# Patient Record
Sex: FEMALE | Race: WHITE | NOT HISPANIC OR LATINO
[De-identification: names, ages, dates, MRNs, and addresses within clinical notes are randomized per-mention and may not be internally consistent; named-entity substitution may affect disease eponyms.]

---

## 2017-04-06 ENCOUNTER — APPOINTMENT (OUTPATIENT)
Dept: CARDIOLOGY | Facility: CLINIC | Age: 82
End: 2017-04-06

## 2017-04-10 ENCOUNTER — NON-APPOINTMENT (OUTPATIENT)
Age: 82
End: 2017-04-10

## 2017-04-10 ENCOUNTER — APPOINTMENT (OUTPATIENT)
Dept: CARDIOLOGY | Facility: CLINIC | Age: 82
End: 2017-04-10

## 2017-04-10 ENCOUNTER — INPATIENT (INPATIENT)
Facility: HOSPITAL | Age: 82
LOS: 3 days | Discharge: NURSING, REHAB OR LONG-TERM CARE | End: 2017-04-14
Attending: INTERNAL MEDICINE | Admitting: INTERNAL MEDICINE
Payer: MEDICARE

## 2017-04-10 VITALS
OXYGEN SATURATION: 95 % | HEART RATE: 92 BPM | DIASTOLIC BLOOD PRESSURE: 76 MMHG | WEIGHT: 170 LBS | HEIGHT: 66 IN | BODY MASS INDEX: 27.32 KG/M2 | RESPIRATION RATE: 16 BRPM | SYSTOLIC BLOOD PRESSURE: 126 MMHG

## 2017-04-10 VITALS
SYSTOLIC BLOOD PRESSURE: 124 MMHG | OXYGEN SATURATION: 94 % | TEMPERATURE: 98 F | RESPIRATION RATE: 29 BRPM | HEART RATE: 85 BPM | DIASTOLIC BLOOD PRESSURE: 60 MMHG

## 2017-04-10 DIAGNOSIS — Z98.890 OTHER SPECIFIED POSTPROCEDURAL STATES: Chronic | ICD-10-CM

## 2017-04-10 DIAGNOSIS — I50.9 HEART FAILURE, UNSPECIFIED: ICD-10-CM

## 2017-04-10 DIAGNOSIS — I48.91 UNSPECIFIED ATRIAL FIBRILLATION: ICD-10-CM

## 2017-04-10 DIAGNOSIS — J44.9 CHRONIC OBSTRUCTIVE PULMONARY DISEASE, UNSPECIFIED: ICD-10-CM

## 2017-04-10 DIAGNOSIS — K21.9 GASTRO-ESOPHAGEAL REFLUX DISEASE WITHOUT ESOPHAGITIS: ICD-10-CM

## 2017-04-10 DIAGNOSIS — Z90.710 ACQUIRED ABSENCE OF BOTH CERVIX AND UTERUS: Chronic | ICD-10-CM

## 2017-04-10 DIAGNOSIS — N17.9 ACUTE KIDNEY FAILURE, UNSPECIFIED: ICD-10-CM

## 2017-04-10 DIAGNOSIS — I50.33 ACUTE ON CHRONIC DIASTOLIC (CONGESTIVE) HEART FAILURE: ICD-10-CM

## 2017-04-10 DIAGNOSIS — Z41.8 ENCOUNTER FOR OTHER PROCEDURES FOR PURPOSES OTHER THAN REMEDYING HEALTH STATE: ICD-10-CM

## 2017-04-10 LAB
ALBUMIN SERPL ELPH-MCNC: 3.7 G/DL — SIGNIFICANT CHANGE UP (ref 3.3–5)
ALP SERPL-CCNC: 47 U/L — SIGNIFICANT CHANGE UP (ref 40–120)
ALT FLD-CCNC: 22 U/L — SIGNIFICANT CHANGE UP (ref 4–33)
APTT BLD: 34.2 SEC — SIGNIFICANT CHANGE UP (ref 27.5–37.4)
AST SERPL-CCNC: 18 U/L — SIGNIFICANT CHANGE UP (ref 4–32)
BASOPHILS # BLD AUTO: 0.01 K/UL — SIGNIFICANT CHANGE UP (ref 0–0.2)
BASOPHILS NFR BLD AUTO: 0.1 % — SIGNIFICANT CHANGE UP (ref 0–2)
BILIRUB SERPL-MCNC: 0.7 MG/DL — SIGNIFICANT CHANGE UP (ref 0.2–1.2)
BUN SERPL-MCNC: 60 MG/DL — HIGH (ref 7–23)
CALCIUM SERPL-MCNC: 8.6 MG/DL — SIGNIFICANT CHANGE UP (ref 8.4–10.5)
CHLORIDE SERPL-SCNC: 100 MMOL/L — SIGNIFICANT CHANGE UP (ref 98–107)
CO2 SERPL-SCNC: 27 MMOL/L — SIGNIFICANT CHANGE UP (ref 22–31)
CREAT SERPL-MCNC: 2.15 MG/DL — HIGH (ref 0.5–1.3)
EOSINOPHIL # BLD AUTO: 0.01 K/UL — SIGNIFICANT CHANGE UP (ref 0–0.5)
EOSINOPHIL NFR BLD AUTO: 0.1 % — SIGNIFICANT CHANGE UP (ref 0–6)
GLUCOSE SERPL-MCNC: 167 MG/DL — HIGH (ref 70–99)
HCT VFR BLD CALC: 30.9 % — LOW (ref 34.5–45)
HGB BLD-MCNC: 9.2 G/DL — LOW (ref 11.5–15.5)
IMM GRANULOCYTES NFR BLD AUTO: 0.7 % — SIGNIFICANT CHANGE UP (ref 0–1.5)
INR BLD: 1.89 — HIGH (ref 0.88–1.17)
LYMPHOCYTES # BLD AUTO: 0.6 K/UL — LOW (ref 1–3.3)
LYMPHOCYTES # BLD AUTO: 7.4 % — LOW (ref 13–44)
MAGNESIUM SERPL-MCNC: 1.2 MG/DL — LOW (ref 1.6–2.6)
MCHC RBC-ENTMCNC: 26.6 PG — LOW (ref 27–34)
MCHC RBC-ENTMCNC: 29.8 % — LOW (ref 32–36)
MCV RBC AUTO: 89.3 FL — SIGNIFICANT CHANGE UP (ref 80–100)
MONOCYTES # BLD AUTO: 0.12 K/UL — SIGNIFICANT CHANGE UP (ref 0–0.9)
MONOCYTES NFR BLD AUTO: 1.5 % — LOW (ref 2–14)
NEUTROPHILS # BLD AUTO: 7.35 K/UL — SIGNIFICANT CHANGE UP (ref 1.8–7.4)
NEUTROPHILS NFR BLD AUTO: 90.2 % — HIGH (ref 43–77)
NT-PROBNP SERPL-SCNC: SIGNIFICANT CHANGE UP PG/ML
PHOSPHATE SERPL-MCNC: 3.6 MG/DL — SIGNIFICANT CHANGE UP (ref 2.5–4.5)
PLATELET # BLD AUTO: 222 K/UL — SIGNIFICANT CHANGE UP (ref 150–400)
PMV BLD: 9.8 FL — SIGNIFICANT CHANGE UP (ref 7–13)
POTASSIUM SERPL-MCNC: 4.6 MMOL/L — SIGNIFICANT CHANGE UP (ref 3.5–5.3)
POTASSIUM SERPL-SCNC: 4.6 MMOL/L — SIGNIFICANT CHANGE UP (ref 3.5–5.3)
PROT SERPL-MCNC: 6.4 G/DL — SIGNIFICANT CHANGE UP (ref 6–8.3)
PROTHROM AB SERPL-ACNC: 21.4 SEC — HIGH (ref 9.8–13.1)
RBC # BLD: 3.46 M/UL — LOW (ref 3.8–5.2)
RBC # FLD: 16.2 % — HIGH (ref 10.3–14.5)
SODIUM SERPL-SCNC: 142 MMOL/L — SIGNIFICANT CHANGE UP (ref 135–145)
WBC # BLD: 8.15 K/UL — SIGNIFICANT CHANGE UP (ref 3.8–10.5)
WBC # FLD AUTO: 8.15 K/UL — SIGNIFICANT CHANGE UP (ref 3.8–10.5)

## 2017-04-10 PROCEDURE — 71010: CPT | Mod: 26

## 2017-04-10 RX ORDER — IPRATROPIUM/ALBUTEROL SULFATE 18-103MCG
3 AEROSOL WITH ADAPTER (GRAM) INHALATION EVERY 6 HOURS
Qty: 0 | Refills: 0 | Status: DISCONTINUED | OUTPATIENT
Start: 2017-04-10 | End: 2017-04-11

## 2017-04-10 RX ORDER — BUDESONIDE AND FORMOTEROL FUMARATE DIHYDRATE 160; 4.5 UG/1; UG/1
2 AEROSOL RESPIRATORY (INHALATION)
Qty: 0 | Refills: 0 | Status: DISCONTINUED | OUTPATIENT
Start: 2017-04-10 | End: 2017-04-11

## 2017-04-10 RX ORDER — SIMVASTATIN 20 MG/1
40 TABLET, FILM COATED ORAL AT BEDTIME
Qty: 0 | Refills: 0 | Status: DISCONTINUED | OUTPATIENT
Start: 2017-04-10 | End: 2017-04-14

## 2017-04-10 RX ORDER — PANTOPRAZOLE SODIUM 20 MG/1
40 TABLET, DELAYED RELEASE ORAL
Qty: 0 | Refills: 0 | Status: DISCONTINUED | OUTPATIENT
Start: 2017-04-10 | End: 2017-04-14

## 2017-04-10 RX ORDER — BUMETANIDE 0.25 MG/ML
0.5 INJECTION INTRAMUSCULAR; INTRAVENOUS
Qty: 10 | Refills: 0 | Status: DISCONTINUED | OUTPATIENT
Start: 2017-04-10 | End: 2017-04-13

## 2017-04-10 RX ORDER — IPRATROPIUM BROMIDE 0.2 MG/ML
1 SOLUTION, NON-ORAL INHALATION THREE TIMES A DAY
Qty: 0 | Refills: 0 | Status: DISCONTINUED | OUTPATIENT
Start: 2017-04-10 | End: 2017-04-11

## 2017-04-10 RX ORDER — MAGNESIUM SULFATE 500 MG/ML
2 VIAL (ML) INJECTION ONCE
Qty: 0 | Refills: 0 | Status: COMPLETED | OUTPATIENT
Start: 2017-04-10 | End: 2017-04-10

## 2017-04-10 RX ADMIN — BUMETANIDE 5 MG/HR: 0.25 INJECTION INTRAMUSCULAR; INTRAVENOUS at 19:19

## 2017-04-10 RX ADMIN — Medication 50 GRAM(S): at 21:12

## 2017-04-10 RX ADMIN — Medication 3 MILLILITER(S): at 21:50

## 2017-04-10 RX ADMIN — SIMVASTATIN 40 MILLIGRAM(S): 20 TABLET, FILM COATED ORAL at 21:12

## 2017-04-10 NOTE — H&P ADULT. - PROBLEM SELECTOR PROBLEM 4
Gastroesophageal reflux disease, esophagitis presence not specified Chronic obstructive pulmonary disease, unspecified COPD type

## 2017-04-10 NOTE — H&P ADULT. - PMH
AAA (Abdominal Aortic Aneurysm)    AAA (abdominal aortic aneurysm)  incisional hernia  Anxiety    Arthritis of Knee    Atrial Fibrillation    Cancer  bladder  COPD, moderate  uses o2 at home  Emphysema    GERD (Gastroesophageal Reflux Disease)    HTN - Hypertension    Hx of Bladder Cancer  Late 1990's  Hyperlipidemia    Hyperlipidemia    Hypertension    Otosclerosis, left  [place]- left ear

## 2017-04-10 NOTE — H&P ADULT. - PROBLEM SELECTOR PLAN 2
- likely 2/2 cardiorenal, will diurese aggressively and monitor  - renally dose meds  - hold entresto and xarelto

## 2017-04-10 NOTE — H&P ADULT. - PROBLEM SELECTOR PLAN 6
- DVT ppx - took AM dose Xarelto already, will follow up tomorrow, may need HSQ  - Diet - low Na, no conc phos  - Dispo - pending

## 2017-04-10 NOTE — H&P ADULT. - PSH
History of Dilatation and Curettage x3  38 years ago  History of Tonsillectomy  60 years ago  History of Total Hysterectomy with Removal of Both Tubes and Ovaries  40 years ago  S/P AAA repair  11/2011  S/P breast lumpectomy    S/P cataract surgery  2012 - bilateral eyes  S/P hysterectomy    Transurethral resection of bladder tumor x2  Late 1990's - x 2 TURBT

## 2017-04-10 NOTE — H&P ADULT. - ASSESSMENT
87yoF PMH HFpEF (55%), COPD on home O2 admitted from HF clinic for acute on chronic HF, planned for aggressive diuresis

## 2017-04-10 NOTE — H&P ADULT. - HISTORY OF PRESENT ILLNESS
87yoF PMH HFpEF (55%), COPD on home O2 (2-3L), mod MR/TR/AI, A fib on Xarelto, admitted from HF clinic due to fluid overload and concern for acute of chronic HF.  There was concern for worsening renal function and uptrending potassium as an outpatient and diuretics were slowed down.  Pt denies CP but is very SOB and shortness of breathe has been worsening over the last month, she cannot climb steps, ambulate with cane only a few steps, sleeps on 2 pillows.  Pt has also noted significant worsening in her lower extremity edema.  Denies any n/v/d, change in bowel habits, notes that she has been urinating less.

## 2017-04-10 NOTE — H&P ADULT. - PROBLEM SELECTOR PLAN 1
- plan for aggressive diuresis with bumex gtt at 0.5mg/hr  - strict i/o, solano if needed  - c/w home metolazone 2.5mg qD  - f/u BNP  - holding home entresto in the setting of FITZ  - holding home metoprolol in the setting of acute HF exacerbation

## 2017-04-10 NOTE — H&P ADULT. - PROBLEM SELECTOR PLAN 3
- rate controlled at this time, may go up since holding metoprolol, holding xarelto 2/2 to FITZ, took dose this AM, will follow up tomorrow for further anticoagulation needs

## 2017-04-11 ENCOUNTER — TRANSCRIPTION ENCOUNTER (OUTPATIENT)
Age: 82
End: 2017-04-11

## 2017-04-11 LAB
ANISOCYTOSIS BLD QL: SLIGHT — SIGNIFICANT CHANGE UP
APPEARANCE UR: CLEAR — SIGNIFICANT CHANGE UP
APTT BLD: 51.7 SEC — HIGH (ref 27.5–37.4)
APTT BLD: 89.3 SEC — HIGH (ref 27.5–37.4)
BACTERIA # UR AUTO: SIGNIFICANT CHANGE UP
BASOPHILS # BLD AUTO: 0.01 K/UL — SIGNIFICANT CHANGE UP (ref 0–0.2)
BASOPHILS NFR BLD AUTO: 0.1 % — SIGNIFICANT CHANGE UP (ref 0–2)
BASOPHILS NFR SPEC: 0 % — SIGNIFICANT CHANGE UP (ref 0–2)
BILIRUB UR-MCNC: NEGATIVE — SIGNIFICANT CHANGE UP
BLOOD UR QL VISUAL: NEGATIVE — SIGNIFICANT CHANGE UP
BUN SERPL-MCNC: 65 MG/DL — HIGH (ref 7–23)
BUN SERPL-MCNC: 66 MG/DL — HIGH (ref 7–23)
CALCIUM SERPL-MCNC: 8.3 MG/DL — LOW (ref 8.4–10.5)
CALCIUM SERPL-MCNC: 8.7 MG/DL — SIGNIFICANT CHANGE UP (ref 8.4–10.5)
CHLORIDE SERPL-SCNC: 100 MMOL/L — SIGNIFICANT CHANGE UP (ref 98–107)
CHLORIDE SERPL-SCNC: 103 MMOL/L — SIGNIFICANT CHANGE UP (ref 98–107)
CO2 SERPL-SCNC: 28 MMOL/L — SIGNIFICANT CHANGE UP (ref 22–31)
CO2 SERPL-SCNC: 30 MMOL/L — SIGNIFICANT CHANGE UP (ref 22–31)
COLOR SPEC: COLORLESS — SIGNIFICANT CHANGE UP
CREAT SERPL-MCNC: 2.33 MG/DL — HIGH (ref 0.5–1.3)
CREAT SERPL-MCNC: 2.46 MG/DL — HIGH (ref 0.5–1.3)
EOSINOPHIL # BLD AUTO: 0.02 K/UL — SIGNIFICANT CHANGE UP (ref 0–0.5)
EOSINOPHIL NFR BLD AUTO: 0.2 % — SIGNIFICANT CHANGE UP (ref 0–6)
EOSINOPHIL NFR FLD: 0 % — SIGNIFICANT CHANGE UP (ref 0–6)
GIANT PLATELETS BLD QL SMEAR: PRESENT — SIGNIFICANT CHANGE UP
GLUCOSE SERPL-MCNC: 135 MG/DL — HIGH (ref 70–99)
GLUCOSE SERPL-MCNC: 170 MG/DL — HIGH (ref 70–99)
GLUCOSE UR-MCNC: NEGATIVE — SIGNIFICANT CHANGE UP
HCT VFR BLD CALC: 28.7 % — LOW (ref 34.5–45)
HGB BLD-MCNC: 8.3 G/DL — LOW (ref 11.5–15.5)
HYALINE CASTS # UR AUTO: SIGNIFICANT CHANGE UP (ref 0–?)
IMM GRANULOCYTES NFR BLD AUTO: 0.4 % — SIGNIFICANT CHANGE UP (ref 0–1.5)
KETONES UR-MCNC: NEGATIVE — SIGNIFICANT CHANGE UP
LEUKOCYTE ESTERASE UR-ACNC: HIGH
LYMPHOCYTES # BLD AUTO: 1.17 K/UL — SIGNIFICANT CHANGE UP (ref 1–3.3)
LYMPHOCYTES # BLD AUTO: 12.6 % — LOW (ref 13–44)
LYMPHOCYTES NFR SPEC AUTO: 5 % — LOW (ref 13–44)
MACROCYTES BLD QL: SLIGHT — SIGNIFICANT CHANGE UP
MAGNESIUM SERPL-MCNC: 1.7 MG/DL — SIGNIFICANT CHANGE UP (ref 1.6–2.6)
MAGNESIUM SERPL-MCNC: 1.7 MG/DL — SIGNIFICANT CHANGE UP (ref 1.6–2.6)
MANUAL SMEAR VERIFICATION: SIGNIFICANT CHANGE UP
MCHC RBC-ENTMCNC: 25.9 PG — LOW (ref 27–34)
MCHC RBC-ENTMCNC: 28.9 % — LOW (ref 32–36)
MCV RBC AUTO: 89.7 FL — SIGNIFICANT CHANGE UP (ref 80–100)
MONOCYTES # BLD AUTO: 0.44 K/UL — SIGNIFICANT CHANGE UP (ref 0–0.9)
MONOCYTES NFR BLD AUTO: 4.7 % — SIGNIFICANT CHANGE UP (ref 2–14)
MONOCYTES NFR BLD: 5 % — SIGNIFICANT CHANGE UP (ref 2–9)
MUCOUS THREADS # UR AUTO: SIGNIFICANT CHANGE UP
MYELOCYTES NFR BLD: 1 % — HIGH (ref 0–0)
NEUTROPHIL AB SER-ACNC: 86 % — HIGH (ref 43–77)
NEUTROPHILS # BLD AUTO: 7.62 K/UL — HIGH (ref 1.8–7.4)
NEUTROPHILS NFR BLD AUTO: 82 % — HIGH (ref 43–77)
NITRITE UR-MCNC: POSITIVE — HIGH
NON-SQ EPI CELLS # UR AUTO: <1 — SIGNIFICANT CHANGE UP
PH UR: 6 — SIGNIFICANT CHANGE UP (ref 4.6–8)
PHOSPHATE SERPL-MCNC: 5.9 MG/DL — HIGH (ref 2.5–4.5)
PLATELET # BLD AUTO: 234 K/UL — SIGNIFICANT CHANGE UP (ref 150–400)
PMV BLD: 10.1 FL — SIGNIFICANT CHANGE UP (ref 7–13)
POIKILOCYTOSIS BLD QL AUTO: SLIGHT — SIGNIFICANT CHANGE UP
POLYCHROMASIA BLD QL SMEAR: SLIGHT — SIGNIFICANT CHANGE UP
POTASSIUM SERPL-MCNC: 4.3 MMOL/L — SIGNIFICANT CHANGE UP (ref 3.5–5.3)
POTASSIUM SERPL-MCNC: 4.8 MMOL/L — SIGNIFICANT CHANGE UP (ref 3.5–5.3)
POTASSIUM SERPL-SCNC: 4.3 MMOL/L — SIGNIFICANT CHANGE UP (ref 3.5–5.3)
POTASSIUM SERPL-SCNC: 4.8 MMOL/L — SIGNIFICANT CHANGE UP (ref 3.5–5.3)
PROT UR-MCNC: NEGATIVE — SIGNIFICANT CHANGE UP
RBC # BLD: 3.2 M/UL — LOW (ref 3.8–5.2)
RBC # FLD: 16.2 % — HIGH (ref 10.3–14.5)
RBC CASTS # UR COMP ASSIST: SIGNIFICANT CHANGE UP (ref 0–?)
SODIUM SERPL-SCNC: 146 MMOL/L — HIGH (ref 135–145)
SODIUM SERPL-SCNC: 147 MMOL/L — HIGH (ref 135–145)
SP GR SPEC: 1.01 — SIGNIFICANT CHANGE UP (ref 1–1.03)
SQUAMOUS # UR AUTO: SIGNIFICANT CHANGE UP
UROBILINOGEN FLD QL: NORMAL E.U. — SIGNIFICANT CHANGE UP (ref 0.1–0.2)
VARIANT LYMPHS # BLD: 3 % — SIGNIFICANT CHANGE UP
WBC # BLD: 9.3 K/UL — SIGNIFICANT CHANGE UP (ref 3.8–10.5)
WBC # FLD AUTO: 9.3 K/UL — SIGNIFICANT CHANGE UP (ref 3.8–10.5)
WBC UR QL: HIGH (ref 0–?)

## 2017-04-11 PROCEDURE — 93306 TTE W/DOPPLER COMPLETE: CPT | Mod: 26

## 2017-04-11 PROCEDURE — 93010 ELECTROCARDIOGRAM REPORT: CPT

## 2017-04-11 PROCEDURE — 99233 SBSQ HOSP IP/OBS HIGH 50: CPT

## 2017-04-11 RX ORDER — HEPARIN SODIUM 5000 [USP'U]/ML
1200 INJECTION INTRAVENOUS; SUBCUTANEOUS
Qty: 25000 | Refills: 0 | Status: DISCONTINUED | OUTPATIENT
Start: 2017-04-11 | End: 2017-04-13

## 2017-04-11 RX ORDER — HEPARIN SODIUM 5000 [USP'U]/ML
1000 INJECTION INTRAVENOUS; SUBCUTANEOUS
Qty: 25000 | Refills: 0 | Status: DISCONTINUED | OUTPATIENT
Start: 2017-04-11 | End: 2017-04-11

## 2017-04-11 RX ORDER — IPRATROPIUM BROMIDE 0.2 MG/ML
2 SOLUTION, NON-ORAL INHALATION THREE TIMES A DAY
Qty: 0 | Refills: 0 | Status: DISCONTINUED | OUTPATIENT
Start: 2017-04-11 | End: 2017-04-14

## 2017-04-11 RX ORDER — MAGNESIUM SULFATE 500 MG/ML
2 VIAL (ML) INJECTION ONCE
Qty: 0 | Refills: 0 | Status: COMPLETED | OUTPATIENT
Start: 2017-04-11 | End: 2017-04-11

## 2017-04-11 RX ORDER — CEFTRIAXONE 500 MG/1
1 INJECTION, POWDER, FOR SOLUTION INTRAMUSCULAR; INTRAVENOUS ONCE
Qty: 0 | Refills: 0 | Status: COMPLETED | OUTPATIENT
Start: 2017-04-11 | End: 2017-04-11

## 2017-04-11 RX ORDER — BUDESONIDE, MICRONIZED 100 %
0.5 POWDER (GRAM) MISCELLANEOUS
Qty: 0 | Refills: 0 | Status: DISCONTINUED | OUTPATIENT
Start: 2017-04-11 | End: 2017-04-14

## 2017-04-11 RX ORDER — CEFTRIAXONE 500 MG/1
1 INJECTION, POWDER, FOR SOLUTION INTRAMUSCULAR; INTRAVENOUS EVERY 24 HOURS
Qty: 0 | Refills: 0 | Status: DISCONTINUED | OUTPATIENT
Start: 2017-04-12 | End: 2017-04-12

## 2017-04-11 RX ORDER — LEVALBUTEROL 1.25 MG/.5ML
2 SOLUTION, CONCENTRATE RESPIRATORY (INHALATION) EVERY 4 HOURS
Qty: 0 | Refills: 0 | Status: DISCONTINUED | OUTPATIENT
Start: 2017-04-11 | End: 2017-04-14

## 2017-04-11 RX ORDER — MAGNESIUM SULFATE 500 MG/ML
1 VIAL (ML) INJECTION ONCE
Qty: 0 | Refills: 0 | Status: COMPLETED | OUTPATIENT
Start: 2017-04-11 | End: 2017-04-11

## 2017-04-11 RX ORDER — WARFARIN SODIUM 2.5 MG/1
5 TABLET ORAL ONCE
Qty: 0 | Refills: 0 | Status: COMPLETED | OUTPATIENT
Start: 2017-04-11 | End: 2017-04-11

## 2017-04-11 RX ORDER — CEFTRIAXONE 500 MG/1
INJECTION, POWDER, FOR SOLUTION INTRAMUSCULAR; INTRAVENOUS
Qty: 0 | Refills: 0 | Status: DISCONTINUED | OUTPATIENT
Start: 2017-04-11 | End: 2017-04-12

## 2017-04-11 RX ADMIN — SIMVASTATIN 40 MILLIGRAM(S): 20 TABLET, FILM COATED ORAL at 22:20

## 2017-04-11 RX ADMIN — Medication 2 PUFF(S): at 21:24

## 2017-04-11 RX ADMIN — CEFTRIAXONE 100 GRAM(S): 500 INJECTION, POWDER, FOR SOLUTION INTRAMUSCULAR; INTRAVENOUS at 04:35

## 2017-04-11 RX ADMIN — Medication 0.5 MILLIGRAM(S): at 21:24

## 2017-04-11 RX ADMIN — Medication 10 MILLIGRAM(S): at 06:36

## 2017-04-11 RX ADMIN — Medication 100 GRAM(S): at 08:45

## 2017-04-11 RX ADMIN — Medication 2 PUFF(S): at 16:04

## 2017-04-11 RX ADMIN — WARFARIN SODIUM 5 MILLIGRAM(S): 2.5 TABLET ORAL at 17:47

## 2017-04-11 RX ADMIN — HEPARIN SODIUM 11 UNIT(S)/HR: 5000 INJECTION INTRAVENOUS; SUBCUTANEOUS at 23:29

## 2017-04-11 RX ADMIN — Medication 3 MILLILITER(S): at 03:33

## 2017-04-11 RX ADMIN — Medication 50 GRAM(S): at 17:47

## 2017-04-11 RX ADMIN — BUMETANIDE 5 MG/HR: 0.25 INJECTION INTRAMUSCULAR; INTRAVENOUS at 23:29

## 2017-04-11 RX ADMIN — PANTOPRAZOLE SODIUM 40 MILLIGRAM(S): 20 TABLET, DELAYED RELEASE ORAL at 06:36

## 2017-04-11 RX ADMIN — HEPARIN SODIUM 10 UNIT(S)/HR: 5000 INJECTION INTRAVENOUS; SUBCUTANEOUS at 09:44

## 2017-04-11 RX ADMIN — BUMETANIDE 5 MG/HR: 0.25 INJECTION INTRAMUSCULAR; INTRAVENOUS at 08:44

## 2017-04-11 NOTE — PHYSICAL THERAPY INITIAL EVALUATION ADULT - PERTINENT HX OF CURRENT PROBLEM, REHAB EVAL
87yoF PMH HFpEF (55%), COPD on home O2 (2-3L), mod MR/TR/AI, A fib on Xarelto, admitted from HF clinic due to fluid overload and concern for acute of chronic HF.

## 2017-04-11 NOTE — DISCHARGE NOTE ADULT - CONDITIONS AT DISCHARGE
Pt sent to rehab in no apparent distress. VSS Pt denied chest pain.  Pt had constant HERRING. and experience SOB that required O2 2LNC at all times.  RR 20-28.  BS diminished t/o. No productive cough noted. Neuro: Pt alert and oriented but at times was pleasantly confused.  She made needs known. Pt had poor ability to stand and help herself out of chair.  She stated that her "balance was poor and couldn't keep herself from sitting back down"  Pt had +BS and had a small BM this am.  She voided frequently clear yellow urine.

## 2017-04-11 NOTE — PHYSICAL THERAPY INITIAL EVALUATION ADULT - ADDITIONAL COMMENTS
Pt lives in private house with 5 steps to enter and chair lift on full flight to bedroom. Pt lives with daughter and ambulates in home with RW.

## 2017-04-11 NOTE — DISCHARGE NOTE ADULT - HOSPITAL COURSE
87yoF PMH COPD on home O2, HFpEF (55%), mod MR/TR/AI, Afib on Xarelto admitted from HF clinic (Dr. Bangura) directly to CCU for aggressive diuresis.  Was started on bumex gtt 0.5mg/hr and metolazone 2.5 mg qD.  Xarleto was initially held for worsened Cr, was started on hep gtt and transitioned to coumadin. 87yoF PMH COPD on home O2, HFpEF (55%), mod MR/TR/AI, Afib on Xarelto admitted from HF clinic (Dr. Bangura) directly to CCU for aggressive diuresis.  Was started on bumex gtt 0.5mg/hr and metolazone 2.5 mg qD.  Xarleto and Entresto was held for worsened Cr, was started on hep gtt and transitioned to coumadin.  Pt diuresed well was transitioned to PO Bumex and started on spironolactone.  Per HF attending Dr. Bangura, pt was recommended for Eliquis and started on Eliquis.  Pt was recommended for SAH by PT and was sent to .... 87yoF PMH COPD on home O2, HFpEF (55%), mod MR/TR/AI, Afib on Xarelto admitted from HF clinic (Dr. Bangura) directly to CCU for aggressive diuresis.  Was started on bumex gtt 0.5mg/hr and metolazone 2.5 mg qD.  Xarleto and Entresto was held for worsened Cr, was started on hep gtt and transitioned to coumadin.  Pt diuresed well was transitioned to PO Bumex and started on spironolactone.  Per HF attending Dr. Bangura, pt was recommended for Eliquis and started on Eliquis.  Pt was recommended for SAH by PT and was sent to rehab 4/14.  Pt was planned to be maintained on PO Bumex 2mg qD.

## 2017-04-11 NOTE — DISCHARGE NOTE ADULT - MEDICATION SUMMARY - MEDICATIONS TO TAKE
I will START or STAY ON the medications listed below when I get home from the hospital:    budesonide 0.5 mg/2 mL inhalation suspension  -- 2 milliliter(s) inhaled 2 times a day  -- Indication: For COPD    predniSONE 20 mg oral tablet  -- 1 tab(s) by mouth once a day  -- Indication: For COPD    spironolactone 25 mg oral tablet  -- 0.5 tab(s) by mouth once a day  -- Indication: For Acute heart failure with normal ejection fraction    simvastatin 40 mg oral tablet  -- 1 tab(s) by mouth once a day (at bedtime)  -- Indication: For CAD    Xopenex HFA 45 mcg/inh inhalation aerosol  -- 2 puff(s) inhaled every 4 hours, As Needed  -- Indication: For COPD    ipratropium 18 mcg/inh inhalation aerosol  -- 1 puff(s) inhaled 3 to 4 times a day  -- Indication: For COPD    nystatin 100,000 units/g topical powder  -- 1 application on skin every 8 hours, As needed, antifungal  -- Indication: For Need for prophylactic measure    bumetanide 2 mg oral tablet  -- 1 tab(s) by mouth once a day  -- Indication: For Acute heart failure with normal ejection fraction    pantoprazole 40 mg oral delayed release tablet  -- 1 tab(s) by mouth once a day  -- Indication: For GERD

## 2017-04-11 NOTE — DISCHARGE NOTE ADULT - CARE PLAN
Principal Discharge DX:	Heart failure with preserved ejection fraction  Goal:	Follow up with Dr. Bangura in clinic  Instructions for follow-up, activity and diet:	Please take your prescribed heart failure medications and see Dr. Bangura in clinic.  Secondary Diagnosis:	Chronic obstructive pulmonary disease, unspecified COPD type  Goal:	Follow up with Dr. Patel in clinic  Instructions for follow-up, activity and diet:	Please take your prescribed COPD medications and see Dr. Patel in clinic. Principal Discharge DX:	Heart failure with preserved ejection fraction  Goal:	Follow up with Dr. Bangura in clinic  Instructions for follow-up, activity and diet:	Please take your prescribed heart failure medications and see Dr. Bangura in clinic.  Call [phone] to make an appointment within the next two weeks.  Also, please make sure that your INR is checked at rehab and make sure that it is trending to normal, your coumadin was stopped and you were recommended to start on Eliquis 2.5mg BID.  Start Eliquis when your INR is less than 2.0.  Secondary Diagnosis:	Chronic obstructive pulmonary disease, unspecified COPD type  Goal:	Follow up with Dr. Patel in clinic  Instructions for follow-up, activity and diet:	Please take your prescribed COPD medications and see Dr. Patel in clinic. [phone]. Principal Discharge DX:	Heart failure with preserved ejection fraction  Goal:	Follow up with Dr. Bangura in clinic  Instructions for follow-up, activity and diet:	Please take your prescribed heart failure medications and see Dr. Bangura in clinic.  Call [phone] to make an appointment within the next two weeks.  Also, please make sure that your INR is checked at rehab and make sure that it is trending to normal, your coumadin was stopped and you were recommended to start on Eliquis 2.5mg BID.  Start Eliquis when your INR is less than 2.0.  Secondary Diagnosis:	Chronic obstructive pulmonary disease, unspecified COPD type  Goal:	Follow up with Dr. Patel in clinic  Instructions for follow-up, activity and diet:	Please take your prescribed COPD medications and see Dr. Patel in clinic. [phone]. Principal Discharge DX:	Heart failure with preserved ejection fraction  Goal:	Follow up with Dr. Bangura in clinic  Instructions for follow-up, activity and diet:	Please take your prescribed heart failure medications and see Dr. Bangura in clinic.  Call [phone] to make an appointment within the next two weeks.  Also, please make sure that your INR is checked at rehab and make sure that it is trending to normal, your coumadin was stopped and you were recommended to start on Eliquis 2.5mg BID.  Start Eliquis when your INR is less than 2.0.  Secondary Diagnosis:	Chronic obstructive pulmonary disease, unspecified COPD type  Goal:	Follow up with Dr. Patel in clinic  Instructions for follow-up, activity and diet:	Please take your prescribed COPD medications and see Dr. Patel in clinic. [phone].

## 2017-04-11 NOTE — DISCHARGE NOTE ADULT - CARE PROVIDER_API CALL
Sameer Bangura), Adv Heart Fail [facility] Card[facility]  [address] [place] [address]  Phone: [phone]  Fax: [phone]    Shi Patel (DO), Critical Care Medicine; Internal Medicine; Pulmonary Disease  [address]  [place] [address]  Phone: [phone]  Fax: [phone]

## 2017-04-11 NOTE — DISCHARGE NOTE ADULT - PLAN OF CARE
Follow up with Dr. Bangura in clinic Follow up with Dr. Patel in clinic Please take your prescribed heart failure medications and see Dr. Bangura in clinic. Please take your prescribed COPD medications and see Dr. Patel in clinic. Please take your prescribed heart failure medications and see Dr. Bangura in clinic.  Call [phone] to make an appointment within the next two weeks.  Also, please make sure that your INR is checked at rehab and make sure that it is trending to normal, your coumadin was stopped and you were recommended to start on Eliquis 2.5mg BID.  Start Eliquis when your INR is less than 2.0. Please take your prescribed COPD medications and see Dr. Patel in clinic. [phone].

## 2017-04-11 NOTE — DISCHARGE NOTE ADULT - CARE PROVIDERS DIRECT ADDRESSES
[email or web address]

## 2017-04-11 NOTE — DISCHARGE NOTE ADULT - PATIENT PORTAL LINK FT
“You can access the Follow[facility] Patient Portal, offered by [facility], by registering with the following website: [email or web address]”

## 2017-04-11 NOTE — DISCHARGE NOTE ADULT - MEDICATION SUMMARY - MEDICATIONS TO STOP TAKING
I will STOP taking the medications listed below when I get home from the hospital:    metoprolol tartrate 100 mg oral tablet  --  by mouth once a day    Xarelto 20 mg oral tablet  -- 1 tab(s) by mouth once a day (in the evening)    Entresto 97 mg-103 mg oral tablet  -- 1 tab(s) by mouth 2 times a day    metOLazone 2.5 mg oral tablet  -- 1 tab(s) by mouth once a day

## 2017-04-12 LAB
APTT BLD: 113.8 SEC — HIGH (ref 27.5–37.4)
APTT BLD: 71.6 SEC — HIGH (ref 27.5–37.4)
APTT BLD: 82 SEC — HIGH (ref 27.5–37.4)
BUN SERPL-MCNC: 69 MG/DL — HIGH (ref 7–23)
BUN SERPL-MCNC: 72 MG/DL — HIGH (ref 7–23)
CALCIUM SERPL-MCNC: 9.3 MG/DL — SIGNIFICANT CHANGE UP (ref 8.4–10.5)
CALCIUM SERPL-MCNC: 9.5 MG/DL — SIGNIFICANT CHANGE UP (ref 8.4–10.5)
CHLORIDE SERPL-SCNC: 94 MMOL/L — LOW (ref 98–107)
CHLORIDE SERPL-SCNC: 96 MMOL/L — LOW (ref 98–107)
CO2 SERPL-SCNC: 32 MMOL/L — HIGH (ref 22–31)
CO2 SERPL-SCNC: 35 MMOL/L — HIGH (ref 22–31)
CREAT SERPL-MCNC: 2.29 MG/DL — HIGH (ref 0.5–1.3)
CREAT SERPL-MCNC: 2.4 MG/DL — HIGH (ref 0.5–1.3)
GLUCOSE SERPL-MCNC: 105 MG/DL — HIGH (ref 70–99)
GLUCOSE SERPL-MCNC: 164 MG/DL — HIGH (ref 70–99)
HCT VFR BLD CALC: 33.3 % — LOW (ref 34.5–45)
HGB BLD-MCNC: 9.7 G/DL — LOW (ref 11.5–15.5)
INR BLD: 1.1 — SIGNIFICANT CHANGE UP (ref 0.88–1.17)
MAGNESIUM SERPL-MCNC: 1.8 MG/DL — SIGNIFICANT CHANGE UP (ref 1.6–2.6)
MAGNESIUM SERPL-MCNC: 2.2 MG/DL — SIGNIFICANT CHANGE UP (ref 1.6–2.6)
MCHC RBC-ENTMCNC: 26.1 PG — LOW (ref 27–34)
MCHC RBC-ENTMCNC: 29.1 % — LOW (ref 32–36)
MCV RBC AUTO: 89.5 FL — SIGNIFICANT CHANGE UP (ref 80–100)
NRBC FLD-RTO: 1 — SIGNIFICANT CHANGE UP
NT-PROBNP SERPL-SCNC: SIGNIFICANT CHANGE UP PG/ML
PHOSPHATE SERPL-MCNC: 3.7 MG/DL — SIGNIFICANT CHANGE UP (ref 2.5–4.5)
PHOSPHATE SERPL-MCNC: 3.9 MG/DL — SIGNIFICANT CHANGE UP (ref 2.5–4.5)
PLATELET # BLD AUTO: 293 K/UL — SIGNIFICANT CHANGE UP (ref 150–400)
PMV BLD: 10.1 FL — SIGNIFICANT CHANGE UP (ref 7–13)
POTASSIUM SERPL-MCNC: 3.8 MMOL/L — SIGNIFICANT CHANGE UP (ref 3.5–5.3)
POTASSIUM SERPL-MCNC: 4.1 MMOL/L — SIGNIFICANT CHANGE UP (ref 3.5–5.3)
POTASSIUM SERPL-SCNC: 3.8 MMOL/L — SIGNIFICANT CHANGE UP (ref 3.5–5.3)
POTASSIUM SERPL-SCNC: 4.1 MMOL/L — SIGNIFICANT CHANGE UP (ref 3.5–5.3)
PROTHROM AB SERPL-ACNC: 12.4 SEC — SIGNIFICANT CHANGE UP (ref 9.8–13.1)
RBC # BLD: 3.72 M/UL — LOW (ref 3.8–5.2)
RBC # FLD: 16.3 % — HIGH (ref 10.3–14.5)
SODIUM SERPL-SCNC: 144 MMOL/L — SIGNIFICANT CHANGE UP (ref 135–145)
SODIUM SERPL-SCNC: 147 MMOL/L — HIGH (ref 135–145)
SPECIMEN SOURCE: SIGNIFICANT CHANGE UP
WBC # BLD: 9.18 K/UL — SIGNIFICANT CHANGE UP (ref 3.8–10.5)
WBC # FLD AUTO: 9.18 K/UL — SIGNIFICANT CHANGE UP (ref 3.8–10.5)

## 2017-04-12 PROCEDURE — 93010 ELECTROCARDIOGRAM REPORT: CPT

## 2017-04-12 PROCEDURE — 99233 SBSQ HOSP IP/OBS HIGH 50: CPT

## 2017-04-12 RX ORDER — WARFARIN SODIUM 2.5 MG/1
5 TABLET ORAL ONCE
Qty: 0 | Refills: 0 | Status: COMPLETED | OUTPATIENT
Start: 2017-04-12 | End: 2017-04-12

## 2017-04-12 RX ORDER — SPIRONOLACTONE 25 MG/1
12.5 TABLET, FILM COATED ORAL DAILY
Qty: 0 | Refills: 0 | Status: DISCONTINUED | OUTPATIENT
Start: 2017-04-12 | End: 2017-04-14

## 2017-04-12 RX ORDER — CEFTRIAXONE 500 MG/1
1 INJECTION, POWDER, FOR SOLUTION INTRAMUSCULAR; INTRAVENOUS EVERY 24 HOURS
Qty: 0 | Refills: 0 | Status: COMPLETED | OUTPATIENT
Start: 2017-04-13 | End: 2017-04-13

## 2017-04-12 RX ADMIN — HEPARIN SODIUM 9 UNIT(S)/HR: 5000 INJECTION INTRAVENOUS; SUBCUTANEOUS at 19:44

## 2017-04-12 RX ADMIN — LEVALBUTEROL 2 PUFF(S): 1.25 SOLUTION, CONCENTRATE RESPIRATORY (INHALATION) at 08:25

## 2017-04-12 RX ADMIN — Medication 2 PUFF(S): at 16:55

## 2017-04-12 RX ADMIN — CEFTRIAXONE 100 GRAM(S): 500 INJECTION, POWDER, FOR SOLUTION INTRAMUSCULAR; INTRAVENOUS at 03:55

## 2017-04-12 RX ADMIN — Medication 2 PUFF(S): at 08:27

## 2017-04-12 RX ADMIN — SPIRONOLACTONE 12.5 MILLIGRAM(S): 25 TABLET, FILM COATED ORAL at 18:01

## 2017-04-12 RX ADMIN — Medication 10 MILLIGRAM(S): at 06:00

## 2017-04-12 RX ADMIN — HEPARIN SODIUM 9 UNIT(S)/HR: 5000 INJECTION INTRAVENOUS; SUBCUTANEOUS at 07:36

## 2017-04-12 RX ADMIN — Medication 10 MILLIGRAM(S): at 12:16

## 2017-04-12 RX ADMIN — PANTOPRAZOLE SODIUM 40 MILLIGRAM(S): 20 TABLET, DELAYED RELEASE ORAL at 06:00

## 2017-04-12 RX ADMIN — BUMETANIDE 5 MG/HR: 0.25 INJECTION INTRAMUSCULAR; INTRAVENOUS at 19:43

## 2017-04-12 RX ADMIN — WARFARIN SODIUM 5 MILLIGRAM(S): 2.5 TABLET ORAL at 18:02

## 2017-04-12 RX ADMIN — Medication 0.5 MILLIGRAM(S): at 23:13

## 2017-04-12 RX ADMIN — LEVALBUTEROL 2 PUFF(S): 1.25 SOLUTION, CONCENTRATE RESPIRATORY (INHALATION) at 14:00

## 2017-04-12 RX ADMIN — Medication 0.5 MILLIGRAM(S): at 08:31

## 2017-04-12 RX ADMIN — Medication 2 PUFF(S): at 23:13

## 2017-04-12 RX ADMIN — SIMVASTATIN 40 MILLIGRAM(S): 20 TABLET, FILM COATED ORAL at 21:59

## 2017-04-12 NOTE — DIETITIAN INITIAL EVALUATION ADULT. - OTHER INFO
Nutrition consult received on 4/10/17 for registered dietitian. Pt. alert, oriented , with 2+ L & R  ankle, foot, leg and knee edema , able to take > 75% of the meals , noted the allergy to shell fish, no difficulty chewing , swallowing voiced , verbalized low K, low sodium and low phos. diet , expect compliance.

## 2017-04-13 LAB
-  AMIKACIN: SIGNIFICANT CHANGE UP
-  AMPICILLIN/SULBACTAM: SIGNIFICANT CHANGE UP
-  AMPICILLIN: SIGNIFICANT CHANGE UP
-  AZTREONAM: SIGNIFICANT CHANGE UP
-  CEFAZOLIN: SIGNIFICANT CHANGE UP
-  CEFEPIME: SIGNIFICANT CHANGE UP
-  CEFOXITIN: SIGNIFICANT CHANGE UP
-  CEFTAZIDIME: SIGNIFICANT CHANGE UP
-  CEFTRIAXONE: SIGNIFICANT CHANGE UP
-  CIPROFLOXACIN: SIGNIFICANT CHANGE UP
-  ERTAPENEM: SIGNIFICANT CHANGE UP
-  GENTAMICIN: SIGNIFICANT CHANGE UP
-  IMIPENEM: SIGNIFICANT CHANGE UP
-  LEVOFLOXACIN: SIGNIFICANT CHANGE UP
-  MEROPENEM: SIGNIFICANT CHANGE UP
-  NITROFURANTOIN: SIGNIFICANT CHANGE UP
-  PIPERACILLIN/TAZOBACTAM: SIGNIFICANT CHANGE UP
-  TIGECYCLINE: SIGNIFICANT CHANGE UP
-  TOBRAMYCIN: SIGNIFICANT CHANGE UP
-  TRIMETHOPRIM/SULFAMETHOXAZOLE: SIGNIFICANT CHANGE UP
ALBUMIN SERPL ELPH-MCNC: 4 G/DL — SIGNIFICANT CHANGE UP (ref 3.3–5)
ALP SERPL-CCNC: 54 U/L — SIGNIFICANT CHANGE UP (ref 40–120)
ALT FLD-CCNC: 19 U/L — SIGNIFICANT CHANGE UP (ref 4–33)
APTT BLD: 75.6 SEC — HIGH (ref 27.5–37.4)
AST SERPL-CCNC: 18 U/L — SIGNIFICANT CHANGE UP (ref 4–32)
BACTERIA UR CULT: SIGNIFICANT CHANGE UP
BILIRUB SERPL-MCNC: 0.7 MG/DL — SIGNIFICANT CHANGE UP (ref 0.2–1.2)
BUN SERPL-MCNC: 76 MG/DL — HIGH (ref 7–23)
CALCIUM SERPL-MCNC: 9.6 MG/DL — SIGNIFICANT CHANGE UP (ref 8.4–10.5)
CHLORIDE SERPL-SCNC: 90 MMOL/L — LOW (ref 98–107)
CO2 SERPL-SCNC: 40 MMOL/L — HIGH (ref 22–31)
CREAT SERPL-MCNC: 2.41 MG/DL — HIGH (ref 0.5–1.3)
GLUCOSE SERPL-MCNC: 120 MG/DL — HIGH (ref 70–99)
HCT VFR BLD CALC: 33.3 % — LOW (ref 34.5–45)
HGB BLD-MCNC: 10.1 G/DL — LOW (ref 11.5–15.5)
INR BLD: 1.52 — HIGH (ref 0.88–1.17)
MAGNESIUM SERPL-MCNC: 1.6 MG/DL — SIGNIFICANT CHANGE UP (ref 1.6–2.6)
MCHC RBC-ENTMCNC: 26.9 PG — LOW (ref 27–34)
MCHC RBC-ENTMCNC: 30.3 % — LOW (ref 32–36)
MCV RBC AUTO: 88.8 FL — SIGNIFICANT CHANGE UP (ref 80–100)
METHOD TYPE: SIGNIFICANT CHANGE UP
ORGANISM # SPEC MICROSCOPIC CNT: SIGNIFICANT CHANGE UP
ORGANISM # SPEC MICROSCOPIC CNT: SIGNIFICANT CHANGE UP
PHOSPHATE SERPL-MCNC: 4.2 MG/DL — SIGNIFICANT CHANGE UP (ref 2.5–4.5)
PLATELET # BLD AUTO: 304 K/UL — SIGNIFICANT CHANGE UP (ref 150–400)
PMV BLD: 10.1 FL — SIGNIFICANT CHANGE UP (ref 7–13)
POTASSIUM SERPL-MCNC: 4 MMOL/L — SIGNIFICANT CHANGE UP (ref 3.5–5.3)
POTASSIUM SERPL-SCNC: 4 MMOL/L — SIGNIFICANT CHANGE UP (ref 3.5–5.3)
PROT SERPL-MCNC: 7.1 G/DL — SIGNIFICANT CHANGE UP (ref 6–8.3)
PROTHROM AB SERPL-ACNC: 17.2 SEC — HIGH (ref 9.8–13.1)
RBC # BLD: 3.75 M/UL — LOW (ref 3.8–5.2)
RBC # FLD: 15.9 % — HIGH (ref 10.3–14.5)
SODIUM SERPL-SCNC: 144 MMOL/L — SIGNIFICANT CHANGE UP (ref 135–145)
WBC # BLD: 9.51 K/UL — SIGNIFICANT CHANGE UP (ref 3.8–10.5)
WBC # FLD AUTO: 9.51 K/UL — SIGNIFICANT CHANGE UP (ref 3.8–10.5)

## 2017-04-13 PROCEDURE — 99232 SBSQ HOSP IP/OBS MODERATE 35: CPT

## 2017-04-13 PROCEDURE — 93010 ELECTROCARDIOGRAM REPORT: CPT

## 2017-04-13 PROCEDURE — 71010: CPT | Mod: 26

## 2017-04-13 PROCEDURE — 99232 SBSQ HOSP IP/OBS MODERATE 35: CPT | Mod: GC

## 2017-04-13 RX ORDER — MAGNESIUM OXIDE 400 MG ORAL TABLET 241.3 MG
400 TABLET ORAL ONCE
Qty: 0 | Refills: 0 | Status: COMPLETED | OUTPATIENT
Start: 2017-04-13 | End: 2017-04-13

## 2017-04-13 RX ORDER — BUMETANIDE 0.25 MG/ML
2 INJECTION INTRAMUSCULAR; INTRAVENOUS
Qty: 0 | Refills: 0 | Status: COMPLETED | OUTPATIENT
Start: 2017-04-13 | End: 2017-04-13

## 2017-04-13 RX ORDER — BUMETANIDE 0.25 MG/ML
2 INJECTION INTRAMUSCULAR; INTRAVENOUS DAILY
Qty: 0 | Refills: 0 | Status: DISCONTINUED | OUTPATIENT
Start: 2017-04-14 | End: 2017-04-14

## 2017-04-13 RX ORDER — ALPRAZOLAM 0.25 MG
0.25 TABLET ORAL ONCE
Qty: 0 | Refills: 0 | Status: DISCONTINUED | OUTPATIENT
Start: 2017-04-13 | End: 2017-04-13

## 2017-04-13 RX ORDER — MAGNESIUM SULFATE 500 MG/ML
2 VIAL (ML) INJECTION ONCE
Qty: 0 | Refills: 0 | Status: COMPLETED | OUTPATIENT
Start: 2017-04-13 | End: 2017-04-13

## 2017-04-13 RX ORDER — METOPROLOL TARTRATE 50 MG
25 TABLET ORAL DAILY
Qty: 0 | Refills: 0 | Status: DISCONTINUED | OUTPATIENT
Start: 2017-04-13 | End: 2017-04-13

## 2017-04-13 RX ORDER — NYSTATIN CREAM 100000 [USP'U]/G
1 CREAM TOPICAL EVERY 8 HOURS
Qty: 0 | Refills: 0 | Status: DISCONTINUED | OUTPATIENT
Start: 2017-04-13 | End: 2017-04-14

## 2017-04-13 RX ORDER — APIXABAN 2.5 MG/1
2.5 TABLET, FILM COATED ORAL
Qty: 0 | Refills: 0 | Status: DISCONTINUED | OUTPATIENT
Start: 2017-04-13 | End: 2017-04-14

## 2017-04-13 RX ORDER — BUMETANIDE 0.25 MG/ML
2 INJECTION INTRAMUSCULAR; INTRAVENOUS EVERY 12 HOURS
Qty: 0 | Refills: 0 | Status: DISCONTINUED | OUTPATIENT
Start: 2017-04-13 | End: 2017-04-13

## 2017-04-13 RX ORDER — WARFARIN SODIUM 2.5 MG/1
7.5 TABLET ORAL ONCE
Qty: 0 | Refills: 0 | Status: DISCONTINUED | OUTPATIENT
Start: 2017-04-13 | End: 2017-04-13

## 2017-04-13 RX ADMIN — SPIRONOLACTONE 12.5 MILLIGRAM(S): 25 TABLET, FILM COATED ORAL at 05:17

## 2017-04-13 RX ADMIN — Medication 50 GRAM(S): at 05:17

## 2017-04-13 RX ADMIN — Medication 0.5 MILLIGRAM(S): at 22:24

## 2017-04-13 RX ADMIN — Medication 2 PUFF(S): at 16:32

## 2017-04-13 RX ADMIN — HEPARIN SODIUM 9 UNIT(S)/HR: 5000 INJECTION INTRAVENOUS; SUBCUTANEOUS at 18:51

## 2017-04-13 RX ADMIN — Medication 0.5 MILLIGRAM(S): at 08:38

## 2017-04-13 RX ADMIN — SIMVASTATIN 40 MILLIGRAM(S): 20 TABLET, FILM COATED ORAL at 22:39

## 2017-04-13 RX ADMIN — MAGNESIUM OXIDE 400 MG ORAL TABLET 400 MILLIGRAM(S): 241.3 TABLET ORAL at 05:19

## 2017-04-13 RX ADMIN — Medication 2 PUFF(S): at 08:38

## 2017-04-13 RX ADMIN — Medication 20 MILLIGRAM(S): at 05:19

## 2017-04-13 RX ADMIN — PANTOPRAZOLE SODIUM 40 MILLIGRAM(S): 20 TABLET, DELAYED RELEASE ORAL at 08:28

## 2017-04-13 RX ADMIN — Medication 25 MILLIGRAM(S): at 08:28

## 2017-04-13 RX ADMIN — APIXABAN 2.5 MILLIGRAM(S): 2.5 TABLET, FILM COATED ORAL at 22:39

## 2017-04-13 RX ADMIN — Medication 2 PUFF(S): at 22:25

## 2017-04-13 RX ADMIN — CEFTRIAXONE 100 GRAM(S): 500 INJECTION, POWDER, FOR SOLUTION INTRAMUSCULAR; INTRAVENOUS at 06:17

## 2017-04-13 RX ADMIN — BUMETANIDE 2 MILLIGRAM(S): 0.25 INJECTION INTRAMUSCULAR; INTRAVENOUS at 19:05

## 2017-04-13 RX ADMIN — Medication 0.25 MILLIGRAM(S): at 08:29

## 2017-04-14 VITALS — OXYGEN SATURATION: 95 %

## 2017-04-14 LAB
APTT BLD: 33.6 SEC — SIGNIFICANT CHANGE UP (ref 27.5–37.4)
BUN SERPL-MCNC: 76 MG/DL — HIGH (ref 7–23)
CALCIUM SERPL-MCNC: 9.7 MG/DL — SIGNIFICANT CHANGE UP (ref 8.4–10.5)
CHLORIDE SERPL-SCNC: 87 MMOL/L — LOW (ref 98–107)
CO2 SERPL-SCNC: 41 MMOL/L — HIGH (ref 22–31)
CREAT SERPL-MCNC: 2.38 MG/DL — HIGH (ref 0.5–1.3)
GLUCOSE SERPL-MCNC: 129 MG/DL — HIGH (ref 70–99)
HCT VFR BLD CALC: 35.6 % — SIGNIFICANT CHANGE UP (ref 34.5–45)
HGB BLD-MCNC: 10.3 G/DL — LOW (ref 11.5–15.5)
INR BLD: 2.44 — HIGH (ref 0.88–1.17)
MAGNESIUM SERPL-MCNC: 2.3 MG/DL — SIGNIFICANT CHANGE UP (ref 1.6–2.6)
MCHC RBC-ENTMCNC: 25.9 PG — LOW (ref 27–34)
MCHC RBC-ENTMCNC: 28.9 % — LOW (ref 32–36)
MCV RBC AUTO: 89.4 FL — SIGNIFICANT CHANGE UP (ref 80–100)
PHOSPHATE SERPL-MCNC: 4.2 MG/DL — SIGNIFICANT CHANGE UP (ref 2.5–4.5)
PLATELET # BLD AUTO: 331 K/UL — SIGNIFICANT CHANGE UP (ref 150–400)
PMV BLD: 10.1 FL — SIGNIFICANT CHANGE UP (ref 7–13)
POTASSIUM SERPL-MCNC: 3.9 MMOL/L — SIGNIFICANT CHANGE UP (ref 3.5–5.3)
POTASSIUM SERPL-SCNC: 3.9 MMOL/L — SIGNIFICANT CHANGE UP (ref 3.5–5.3)
PROTHROM AB SERPL-ACNC: 27.8 SEC — HIGH (ref 9.8–13.1)
RBC # BLD: 3.98 M/UL — SIGNIFICANT CHANGE UP (ref 3.8–5.2)
RBC # FLD: 16 % — HIGH (ref 10.3–14.5)
SODIUM SERPL-SCNC: 143 MMOL/L — SIGNIFICANT CHANGE UP (ref 135–145)
WBC # BLD: 11.02 K/UL — HIGH (ref 3.8–10.5)
WBC # FLD AUTO: 11.02 K/UL — HIGH (ref 3.8–10.5)

## 2017-04-14 PROCEDURE — 93010 ELECTROCARDIOGRAM REPORT: CPT

## 2017-04-14 PROCEDURE — 99238 HOSP IP/OBS DSCHRG MGMT 30/<: CPT

## 2017-04-14 RX ORDER — NYSTATIN CREAM 100000 [USP'U]/G
1 CREAM TOPICAL
Qty: 0 | Refills: 0 | COMMUNITY
Start: 2017-04-14

## 2017-04-14 RX ORDER — BUMETANIDE 0.25 MG/ML
1 INJECTION INTRAMUSCULAR; INTRAVENOUS
Qty: 0 | Refills: 0 | COMMUNITY
Start: 2017-04-14

## 2017-04-14 RX ORDER — SPIRONOLACTONE 25 MG/1
0.5 TABLET, FILM COATED ORAL
Qty: 0 | Refills: 0 | COMMUNITY
Start: 2017-04-14

## 2017-04-14 RX ORDER — SACUBITRIL AND VALSARTAN 24; 26 MG/1; MG/1
1 TABLET, FILM COATED ORAL
Qty: 0 | Refills: 0 | COMMUNITY

## 2017-04-14 RX ORDER — ACETAZOLAMIDE 250 MG/1
500 TABLET ORAL ONCE
Qty: 0 | Refills: 0 | Status: COMPLETED | OUTPATIENT
Start: 2017-04-14 | End: 2017-04-14

## 2017-04-14 RX ORDER — SPIRONOLACTONE 25 MG/1
1 TABLET, FILM COATED ORAL
Qty: 0 | Refills: 0 | COMMUNITY
Start: 2017-04-14

## 2017-04-14 RX ADMIN — Medication 0.5 MILLIGRAM(S): at 09:05

## 2017-04-14 RX ADMIN — SPIRONOLACTONE 12.5 MILLIGRAM(S): 25 TABLET, FILM COATED ORAL at 05:49

## 2017-04-14 RX ADMIN — Medication 20 MILLIGRAM(S): at 05:49

## 2017-04-14 RX ADMIN — Medication 2 PUFF(S): at 15:24

## 2017-04-14 RX ADMIN — LEVALBUTEROL 2 PUFF(S): 1.25 SOLUTION, CONCENTRATE RESPIRATORY (INHALATION) at 09:03

## 2017-04-14 RX ADMIN — BUMETANIDE 2 MILLIGRAM(S): 0.25 INJECTION INTRAMUSCULAR; INTRAVENOUS at 05:48

## 2017-04-14 RX ADMIN — Medication 30 MILLILITER(S): at 02:58

## 2017-04-14 RX ADMIN — Medication 2 PUFF(S): at 09:17

## 2017-04-14 RX ADMIN — APIXABAN 2.5 MILLIGRAM(S): 2.5 TABLET, FILM COATED ORAL at 05:49

## 2017-04-14 RX ADMIN — PANTOPRAZOLE SODIUM 40 MILLIGRAM(S): 20 TABLET, DELAYED RELEASE ORAL at 05:49

## 2017-04-14 RX ADMIN — ACETAZOLAMIDE 500 MILLIGRAM(S): 250 TABLET ORAL at 16:37

## 2017-05-22 ENCOUNTER — APPOINTMENT (OUTPATIENT)
Dept: CARDIOLOGY | Facility: CLINIC | Age: 82
End: 2017-05-22

## 2017-05-22 ENCOUNTER — NON-APPOINTMENT (OUTPATIENT)
Age: 82
End: 2017-05-22

## 2017-05-22 VITALS
WEIGHT: 159 LBS | HEIGHT: 66 IN | BODY MASS INDEX: 25.55 KG/M2 | DIASTOLIC BLOOD PRESSURE: 73 MMHG | HEART RATE: 67 BPM | SYSTOLIC BLOOD PRESSURE: 150 MMHG | OXYGEN SATURATION: 97 %

## 2017-05-22 RX ORDER — METOPROLOL SUCCINATE 25 MG/1
25 TABLET, EXTENDED RELEASE ORAL
Qty: 30 | Refills: 3 | Status: DISCONTINUED | COMMUNITY
Start: 2017-05-22 | End: 2017-05-22

## 2017-05-24 ENCOUNTER — RESULT REVIEW (OUTPATIENT)
Age: 82
End: 2017-05-24

## 2017-05-24 LAB
ALBUMIN SERPL ELPH-MCNC: 4.2 G/DL
ALP BLD-CCNC: 61 U/L
ALT SERPL-CCNC: 14 U/L
ANION GAP SERPL CALC-SCNC: 27 MMOL/L
AST SERPL-CCNC: 23 U/L
BILIRUB SERPL-MCNC: 0.5 MG/DL
BUN SERPL-MCNC: 67 MG/DL
CALCIUM SERPL-MCNC: 10.2 MG/DL
CHLORIDE SERPL-SCNC: 94 MMOL/L
CO2 SERPL-SCNC: 21 MMOL/L
CREAT SERPL-MCNC: 1.99 MG/DL
GLUCOSE SERPL-MCNC: 113 MG/DL
MAGNESIUM SERPL-MCNC: 1.9 MG/DL
NT-PROBNP SERPL-MCNC: 4114 PG/ML
POTASSIUM SERPL-SCNC: 4.3 MMOL/L
PROT SERPL-MCNC: 7.6 G/DL
SODIUM SERPL-SCNC: 142 MMOL/L

## 2017-06-09 ENCOUNTER — RX RENEWAL (OUTPATIENT)
Age: 82
End: 2017-06-09

## 2017-06-11 ENCOUNTER — EMERGENCY (EMERGENCY)
Facility: HOSPITAL | Age: 82
LOS: 1 days | Discharge: HOME | End: 2017-06-11
Attending: EMERGENCY MEDICINE | Admitting: EMERGENCY MEDICINE
Payer: MEDICARE

## 2017-06-11 VITALS
SYSTOLIC BLOOD PRESSURE: 148 MMHG | HEART RATE: 82 BPM | DIASTOLIC BLOOD PRESSURE: 66 MMHG | TEMPERATURE: 99 F | RESPIRATION RATE: 14 BRPM | OXYGEN SATURATION: 96 %

## 2017-06-11 VITALS
SYSTOLIC BLOOD PRESSURE: 157 MMHG | DIASTOLIC BLOOD PRESSURE: 65 MMHG | HEIGHT: 66 IN | WEIGHT: 160.94 LBS | RESPIRATION RATE: 16 BRPM | TEMPERATURE: 98 F | OXYGEN SATURATION: 95 % | HEART RATE: 88 BPM
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
## 2017-06-11 DIAGNOSIS — Z90.710 ACQUIRED ABSENCE OF BOTH CERVIX AND UTERUS: Chronic | ICD-10-CM

## 2017-06-11 DIAGNOSIS — J44.9 CHRONIC OBSTRUCTIVE PULMONARY DISEASE, UNSPECIFIED: ICD-10-CM

## 2017-06-11 DIAGNOSIS — Z79.01 LONG TERM (CURRENT) USE OF ANTICOAGULANTS: ICD-10-CM

## 2017-06-11 DIAGNOSIS — E78.5 HYPERLIPIDEMIA, UNSPECIFIED: ICD-10-CM

## 2017-06-11 DIAGNOSIS — K21.9 GASTRO-ESOPHAGEAL REFLUX DISEASE WITHOUT ESOPHAGITIS: ICD-10-CM

## 2017-06-11 DIAGNOSIS — S09.90XA UNSPECIFIED INJURY OF HEAD, INITIAL ENCOUNTER: ICD-10-CM

## 2017-06-11 DIAGNOSIS — Z91.040 LATEX ALLERGY STATUS: ICD-10-CM
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
## 2017-06-11 DIAGNOSIS — I10 ESSENTIAL (PRIMARY) HYPERTENSION: ICD-10-CM

## 2017-06-11 DIAGNOSIS — Z79.52 LONG TERM (CURRENT) USE OF SYSTEMIC STEROIDS: ICD-10-CM

## 2017-06-11 DIAGNOSIS — I48.91 UNSPECIFIED ATRIAL FIBRILLATION: ICD-10-CM

## 2017-06-11 DIAGNOSIS — Z85.51 PERSONAL HISTORY OF MALIGNANT NEOPLASM OF BLADDER: ICD-10-CM

## 2017-06-11 DIAGNOSIS — Z98.890 OTHER SPECIFIED POSTPROCEDURAL STATES: Chronic | ICD-10-CM

## 2017-06-11 DIAGNOSIS — F41.9 ANXIETY DISORDER, UNSPECIFIED: ICD-10-CM

## 2017-06-11 DIAGNOSIS — Z88.0 ALLERGY STATUS TO PENICILLIN: ICD-10-CM

## 2017-06-11 PROCEDURE — 70450 CT HEAD/BRAIN W/O DYE: CPT

## 2017-06-11 PROCEDURE — 99284 EMERGENCY DEPT VISIT MOD MDM: CPT

## 2017-06-11 PROCEDURE — 70450 CT HEAD/BRAIN W/O DYE: CPT | Mod: 26

## 2017-06-11 PROCEDURE — 99284 EMERGENCY DEPT VISIT MOD MDM: CPT | Mod: 25

## 2017-06-11 PROCEDURE — 72125 CT NECK SPINE W/O DYE: CPT | Mod: 26

## 2017-06-11 PROCEDURE — 72125 CT NECK SPINE W/O DYE: CPT

## 2017-06-11 NOTE — ED PROVIDER NOTE - OBJECTIVE STATEMENT
88 y/o female presents to ED c/o head trauma s/p trip and fall x 5 hours ago. States she fell back and hit the back of her head. Denies LOC. Pt states she is on eliquis. Pt denies any pain. Denies any other complaints. States she otherwise feels good. Denies n/v, f/c, chest pain, sob, numbness, tingling, recent traveling, headache, lightheadedness, dizziness, visual changes.

## 2017-06-11 NOTE — ED PROVIDER NOTE - CHPI ED SYMPTOMS NEG
no loss of consciousness/no syncope/no nausea/no vomiting/no weakness/no seizure/no blurred vision/no confusion/no dizziness/no change in level of consciousness

## 2017-06-11 NOTE — ED PROVIDER NOTE - MEDICAL DECISION MAKING DETAILS
ct head/cervical spine  Please follow up with your Primary MD in 24 hr. Return to ED immediately if condition worsens or any concerns.  Seek immediate medical care for any new/worsening signs or symptoms.

## 2017-06-11 NOTE — ED ADULT NURSE NOTE - PMH
AAA (Abdominal Aortic Aneurysm)    AAA (abdominal aortic aneurysm)  incisional hernia  Anxiety    Arthritis of Knee    Atrial Fibrillation    Cancer  bladder  COPD, moderate  uses o2 at home  Emphysema    GERD (Gastroesophageal Reflux Disease)    HTN - Hypertension    Hx of Bladder Cancer  Late 1990's  Hyperlipidemia    Hyperlipidemia    Hypertension    Otosclerosis, left  [place]- left ear

## 2017-06-11 NOTE — ED ADULT TRIAGE NOTE - CHIEF COMPLAINT QUOTE
trip and fall at home hit the back of her head was concerned because she is on blood thinners  happened 5 hrs ago

## 2017-06-11 NOTE — ED PROVIDER NOTE - PMH
AAA (Abdominal Aortic Aneurysm)    AAA (abdominal aortic aneurysm)  incisional hernia  Anxiety    Arthritis of Knee    Atrial Fibrillation    Cancer  bladder  COPD, moderate  uses o2 at home  Emphysema    GERD (Gastroesophageal Reflux Disease)    HTN - Hypertension    Hx of Bladder Cancer  Late 1990's  Hyperlipidemia    Hyperlipidemia    Hypertension    Otosclerosis, left  [place]- left ear

## 2017-06-11 NOTE — ED PROVIDER NOTE - CARE PLAN
Principal Discharge DX:	Injury of head, initial encounter  Secondary Diagnosis:	Fall, initial encounter

## 2017-06-11 NOTE — ED ADULT NURSE NOTE - NS ED NURSE DC INFO COMPLEXITY
Verbalized Understanding/Returned Demonstration/Simple: Patient demonstrates quick and easy understanding/Patient asked questions

## 2017-06-11 NOTE — ED ADULT NURSE NOTE - OBJECTIVE STATEMENT
pt aox3, : fell today, head injury, no loc" no n/v, denies neck pain", lt arm bruise, FROM 4 extremities

## 2017-06-11 NOTE — ED PROVIDER NOTE - ATTENDING CONTRIBUTION TO CARE
pt requesting eval s/p trip and fall 5 hrs ago at home. no loc, neck or back pain, weakness, numbness, arm or leg pain.  ncat, perrl, mmm, s1s2 rrr, lungs clear b/l, abd soft, nt, spine no midline tenderness, ext nt, full rom x 4 cn 2-12 intact

## 2017-06-11 NOTE — ED PROVIDER NOTE - PHYSICAL EXAMINATION
Head- NC/AT. No canseco signs, no raccoon eyes, no hemotympanum, no contusions, no hematoma, no dental injuries.  Spine- no midline or paraspinal tenderness of cspine, thoracic spine or lumbar spine. No signs of back trauma, no masses, no abrasions, no lacerations, no redness, no bruising.  Neck- supple, no midline tenderness to palpation, + FROM, no abrasions, no ecchymosis.  Neuro- EOM intact, no nystagmus. Pelvis stable. Pt able to straight leg raise BL. NVI, good distal pulses x 4 extremities, capillary refill <2 sec x 4 extremities, sensation intact throughout, 5/5 motor x 4 extremities. Pt ambulatory with walker. DTRs normal x 4 extremities.  Chest- no chest wall tenderness, equal expansion BL. No clavicular tenderness BL. No bruising, no deformity, no redness, no abrasions.  Extremities- No bony tenderness of upper or lower extremities BL except where noted. FROM shoulders, elbows, wrists, hips, knees, ankles. NVI, good distal pulses x 4 extremities, capillary refill <2 sec x 4 extremities, sensation intact throughout, 5/5 motor x 4 extremities. No calf tenderness BL.

## 2017-06-12 ENCOUNTER — MEDICATION RENEWAL (OUTPATIENT)
Age: 82
End: 2017-06-12

## 2017-06-19 ENCOUNTER — APPOINTMENT (OUTPATIENT)
Dept: CARDIOLOGY | Facility: CLINIC | Age: 82
End: 2017-06-19

## 2017-06-19 ENCOUNTER — NON-APPOINTMENT (OUTPATIENT)
Age: 82
End: 2017-06-19

## 2017-06-19 VITALS
HEIGHT: 66 IN | BODY MASS INDEX: 25.55 KG/M2 | DIASTOLIC BLOOD PRESSURE: 63 MMHG | SYSTOLIC BLOOD PRESSURE: 133 MMHG | OXYGEN SATURATION: 93 % | HEART RATE: 70 BPM | WEIGHT: 159 LBS

## 2017-06-20 ENCOUNTER — MEDICATION RENEWAL (OUTPATIENT)
Age: 82
End: 2017-06-20

## 2017-06-20 ENCOUNTER — RESULT REVIEW (OUTPATIENT)
Age: 82
End: 2017-06-20

## 2017-06-20 DIAGNOSIS — E83.42 HYPOMAGNESEMIA: ICD-10-CM

## 2017-06-20 LAB
ALBUMIN SERPL ELPH-MCNC: 4.1 G/DL
ALP BLD-CCNC: 64 U/L
ALT SERPL-CCNC: 13 U/L
ANION GAP SERPL CALC-SCNC: 26 MMOL/L
AST SERPL-CCNC: 24 U/L
BILIRUB SERPL-MCNC: 0.6 MG/DL
BUN SERPL-MCNC: 64 MG/DL
CALCIUM SERPL-MCNC: 9.8 MG/DL
CHLORIDE SERPL-SCNC: 89 MMOL/L
CO2 SERPL-SCNC: 26 MMOL/L
CREAT SERPL-MCNC: 2.03 MG/DL
GLUCOSE SERPL-MCNC: 166 MG/DL
MAGNESIUM SERPL-MCNC: 1.8 MG/DL
NT-PROBNP SERPL-MCNC: 3714 PG/ML
POTASSIUM SERPL-SCNC: 3.6 MMOL/L
PROT SERPL-MCNC: 7.4 G/DL
SODIUM SERPL-SCNC: 141 MMOL/L

## 2017-06-20 RX ORDER — SPIRONOLACTONE 25 MG/1
25 TABLET ORAL
Qty: 90 | Refills: 3 | Status: ACTIVE | COMMUNITY
Start: 2017-05-22 | End: 1900-01-01

## 2017-07-03 ENCOUNTER — MEDICATION RENEWAL (OUTPATIENT)
Age: 82
End: 2017-07-03

## 2017-08-02 ENCOUNTER — MEDICATION RENEWAL (OUTPATIENT)
Age: 82
End: 2017-08-02

## 2017-08-02 RX ORDER — APIXABAN 2.5 MG/1
2.5 TABLET, FILM COATED ORAL
Qty: 60 | Refills: 6 | Status: ACTIVE | COMMUNITY
Start: 2017-05-22 | End: 1900-01-01

## 2017-08-07 ENCOUNTER — CHART COPY (OUTPATIENT)
Age: 82
End: 2017-08-07

## 2017-08-21 ENCOUNTER — APPOINTMENT (OUTPATIENT)
Dept: CARDIOLOGY | Facility: CLINIC | Age: 82
End: 2017-08-21
Payer: MEDICARE

## 2017-08-21 VITALS
HEIGHT: 66 IN | WEIGHT: 164 LBS | DIASTOLIC BLOOD PRESSURE: 77 MMHG | OXYGEN SATURATION: 98 % | BODY MASS INDEX: 26.36 KG/M2 | HEART RATE: 81 BPM | SYSTOLIC BLOOD PRESSURE: 150 MMHG

## 2017-08-21 PROCEDURE — 93000 ELECTROCARDIOGRAM COMPLETE: CPT

## 2017-08-21 PROCEDURE — 99215 OFFICE O/P EST HI 40 MIN: CPT

## 2017-08-21 PROCEDURE — 36415 COLL VENOUS BLD VENIPUNCTURE: CPT

## 2017-08-21 RX ORDER — PREDNISONE 10 MG/1
10 TABLET ORAL
Qty: 30 | Refills: 0 | Status: ACTIVE | COMMUNITY
Start: 2017-05-22

## 2017-08-21 RX ORDER — DOCUSATE SODIUM 100 MG/1
100 CAPSULE ORAL
Qty: 60 | Refills: 3 | Status: DISCONTINUED | COMMUNITY
Start: 2017-06-20 | End: 2017-08-21

## 2017-08-22 ENCOUNTER — NON-APPOINTMENT (OUTPATIENT)
Age: 82
End: 2017-08-22

## 2017-08-22 LAB
ANION GAP SERPL CALC-SCNC: 23 MMOL/L
BUN SERPL-MCNC: 103 MG/DL
CALCIUM SERPL-MCNC: 10.5 MG/DL
CHLORIDE SERPL-SCNC: 94 MMOL/L
CO2 SERPL-SCNC: 27 MMOL/L
CREAT SERPL-MCNC: 2.34 MG/DL
GLUCOSE SERPL-MCNC: 155 MG/DL
MAGNESIUM SERPL-MCNC: 2.4 MG/DL
NT-PROBNP SERPL-MCNC: 3690 PG/ML
POTASSIUM SERPL-SCNC: 4.1 MMOL/L
SODIUM SERPL-SCNC: 144 MMOL/L

## 2017-08-24 ENCOUNTER — MEDICATION RENEWAL (OUTPATIENT)
Age: 82
End: 2017-08-24

## 2017-08-25 ENCOUNTER — RESULT REVIEW (OUTPATIENT)
Age: 82
End: 2017-08-25

## 2017-09-18 ENCOUNTER — APPOINTMENT (OUTPATIENT)
Dept: CARDIOLOGY | Facility: CLINIC | Age: 82
End: 2017-09-18
Payer: MEDICARE

## 2017-09-18 ENCOUNTER — NON-APPOINTMENT (OUTPATIENT)
Age: 82
End: 2017-09-18

## 2017-09-18 VITALS
BODY MASS INDEX: 27 KG/M2 | SYSTOLIC BLOOD PRESSURE: 136 MMHG | RESPIRATION RATE: 18 BRPM | OXYGEN SATURATION: 98 % | HEART RATE: 89 BPM | DIASTOLIC BLOOD PRESSURE: 75 MMHG | HEIGHT: 66 IN | WEIGHT: 168 LBS

## 2017-09-18 PROCEDURE — 99214 OFFICE O/P EST MOD 30 MIN: CPT

## 2017-09-18 PROCEDURE — 36415 COLL VENOUS BLD VENIPUNCTURE: CPT

## 2017-09-18 PROCEDURE — 93000 ELECTROCARDIOGRAM COMPLETE: CPT

## 2017-09-19 ENCOUNTER — OTHER (OUTPATIENT)
Age: 82
End: 2017-09-19

## 2017-09-19 LAB
ANION GAP SERPL CALC-SCNC: 21 MMOL/L
BUN SERPL-MCNC: 74 MG/DL
CALCIUM SERPL-MCNC: 10.3 MG/DL
CHLORIDE SERPL-SCNC: 90 MMOL/L
CO2 SERPL-SCNC: 30 MMOL/L
CREAT SERPL-MCNC: 2.14 MG/DL
GLUCOSE SERPL-MCNC: 136 MG/DL
NT-PROBNP SERPL-MCNC: 5242 PG/ML
POTASSIUM SERPL-SCNC: 4.1 MMOL/L
SODIUM SERPL-SCNC: 141 MMOL/L

## 2017-09-22 ENCOUNTER — CLINICAL ADVICE (OUTPATIENT)
Age: 82
End: 2017-09-22

## 2017-09-25 ENCOUNTER — LABORATORY RESULT (OUTPATIENT)
Age: 82
End: 2017-09-25

## 2017-09-28 ENCOUNTER — OTHER (OUTPATIENT)
Age: 82
End: 2017-09-28

## 2017-10-02 ENCOUNTER — MEDICATION RENEWAL (OUTPATIENT)
Age: 82
End: 2017-10-02

## 2017-10-12 ENCOUNTER — NON-APPOINTMENT (OUTPATIENT)
Age: 82
End: 2017-10-12

## 2017-10-12 ENCOUNTER — APPOINTMENT (OUTPATIENT)
Dept: CARDIOLOGY | Facility: CLINIC | Age: 82
End: 2017-10-12
Payer: MEDICARE

## 2017-10-12 VITALS
HEIGHT: 66 IN | HEART RATE: 45 BPM | RESPIRATION RATE: 18 BRPM | BODY MASS INDEX: 27 KG/M2 | OXYGEN SATURATION: 98 % | SYSTOLIC BLOOD PRESSURE: 142 MMHG | WEIGHT: 168 LBS | DIASTOLIC BLOOD PRESSURE: 57 MMHG

## 2017-10-12 PROCEDURE — 36415 COLL VENOUS BLD VENIPUNCTURE: CPT

## 2017-10-12 PROCEDURE — 93000 ELECTROCARDIOGRAM COMPLETE: CPT

## 2017-10-12 PROCEDURE — 99215 OFFICE O/P EST HI 40 MIN: CPT

## 2017-10-13 ENCOUNTER — RESULT REVIEW (OUTPATIENT)
Age: 82
End: 2017-10-13

## 2017-10-13 LAB
ANION GAP SERPL CALC-SCNC: 22 MMOL/L
BUN SERPL-MCNC: 89 MG/DL
CALCIUM SERPL-MCNC: 10 MG/DL
CHLORIDE SERPL-SCNC: 91 MMOL/L
CO2 SERPL-SCNC: 25 MMOL/L
CREAT SERPL-MCNC: 2.36 MG/DL
GLUCOSE SERPL-MCNC: 231 MG/DL
NT-PROBNP SERPL-MCNC: 3553 PG/ML
POTASSIUM SERPL-SCNC: 3.8 MMOL/L
SODIUM SERPL-SCNC: 138 MMOL/L

## 2017-10-17 ENCOUNTER — MEDICATION RENEWAL (OUTPATIENT)
Age: 82
End: 2017-10-17

## 2017-10-17 RX ORDER — MAGNESIUM OXIDE 241.3 MG/1000MG
400 TABLET ORAL DAILY
Qty: 30 | Refills: 3 | Status: ACTIVE | COMMUNITY
Start: 2017-06-20 | End: 1900-01-01

## 2017-10-23 ENCOUNTER — EMERGENCY (EMERGENCY)
Facility: HOSPITAL | Age: 82
LOS: 1 days | Discharge: HOME | End: 2017-10-23
Attending: EMERGENCY MEDICINE | Admitting: EMERGENCY MEDICINE
Payer: MEDICARE

## 2017-10-23 VITALS
RESPIRATION RATE: 16 BRPM | OXYGEN SATURATION: 98 % | HEART RATE: 78 BPM | DIASTOLIC BLOOD PRESSURE: 76 MMHG | TEMPERATURE: 98 F | SYSTOLIC BLOOD PRESSURE: 126 MMHG

## 2017-10-23 VITALS
WEIGHT: 169.09 LBS | TEMPERATURE: 98 F | HEIGHT: 66 IN | HEART RATE: 80 BPM | DIASTOLIC BLOOD PRESSURE: 100 MMHG | SYSTOLIC BLOOD PRESSURE: 175 MMHG | RESPIRATION RATE: 18 BRPM | OXYGEN SATURATION: 98 %

## 2017-10-23 DIAGNOSIS — Z98.890 OTHER SPECIFIED POSTPROCEDURAL STATES: Chronic | ICD-10-CM

## 2017-10-23 DIAGNOSIS — Z90.710 ACQUIRED ABSENCE OF BOTH CERVIX AND UTERUS: Chronic | ICD-10-CM

## 2017-10-23 DIAGNOSIS — J44.9 CHRONIC OBSTRUCTIVE PULMONARY DISEASE, UNSPECIFIED: ICD-10-CM
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
## 2017-10-23 DIAGNOSIS — I10 ESSENTIAL (PRIMARY) HYPERTENSION: ICD-10-CM

## 2017-10-23 DIAGNOSIS — I48.91 UNSPECIFIED ATRIAL FIBRILLATION: ICD-10-CM
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
## 2017-10-23 DIAGNOSIS — Z79.01 LONG TERM (CURRENT) USE OF ANTICOAGULANTS: ICD-10-CM

## 2017-10-23 DIAGNOSIS — K21.9 GASTRO-ESOPHAGEAL REFLUX DISEASE WITHOUT ESOPHAGITIS: ICD-10-CM

## 2017-10-23 DIAGNOSIS — S09.90XA UNSPECIFIED INJURY OF HEAD, INITIAL ENCOUNTER: ICD-10-CM

## 2017-10-23 DIAGNOSIS — Z85.51 PERSONAL HISTORY OF MALIGNANT NEOPLASM OF BLADDER: ICD-10-CM

## 2017-10-23 DIAGNOSIS — Z88.0 ALLERGY STATUS TO PENICILLIN: ICD-10-CM

## 2017-10-23 DIAGNOSIS — Z91.010 ALLERGY TO PEANUTS: ICD-10-CM

## 2017-10-23 PROCEDURE — 70450 CT HEAD/BRAIN W/O DYE: CPT | Mod: 26

## 2017-10-23 PROCEDURE — 70450 CT HEAD/BRAIN W/O DYE: CPT

## 2017-10-23 PROCEDURE — 99284 EMERGENCY DEPT VISIT MOD MDM: CPT

## 2017-10-23 PROCEDURE — 99284 EMERGENCY DEPT VISIT MOD MDM: CPT | Mod: 25

## 2017-10-23 RX ORDER — LEVALBUTEROL 1.25 MG/.5ML
2 SOLUTION, CONCENTRATE RESPIRATORY (INHALATION)
Qty: 0 | Refills: 0 | COMMUNITY

## 2017-10-23 NOTE — ED PROVIDER NOTE - HEAD, MLM
Head is atraumatic. Head shape is symmetrical. Head is atraumatic. No abrasion, no laceration. Head shape is symmetrical.

## 2017-10-23 NOTE — ED ADULT NURSE NOTE - CHIEF COMPLAINT QUOTE
S/P trip and fall in living room  over her oxygen tubing and hit head on couch (Pt on Eliquis) S/P trip and fall in living room over her oxygen tubing and hit head on couch (Pt on Eliquis)

## 2017-10-23 NOTE — ED PROVIDER NOTE - OBJECTIVE STATEMENT
89 yo female hx emphysema, afib on eliquis s/p mechanical fall, got caught on oxygen tank, fell backwards hit back of head against commode.  Here for CT head.  Denies any other injury, no nausea/vomiting.  No dizziness. No neck pain

## 2017-10-23 NOTE — ED ADULT NURSE NOTE - CHPI ED SYMPTOMS NEG
no nausea/no loss of consciousness/no confusion/no fever/no change in level of consciousness/no blurred vision/no dizziness/no vomiting/no numbness

## 2017-10-23 NOTE — ED ADULT NURSE NOTE - OBJECTIVE STATEMENT
c/o weakness fell back and hit the back of head denies LOC Nausea  dizzy.  "I am on blood thinners and need a cat scan"

## 2017-10-23 NOTE — ED PROVIDER NOTE - PMH
AAA (abdominal aortic aneurysm)  incisional hernia  AAA (Abdominal Aortic Aneurysm)    Anxiety    Arthritis of Knee    Atrial Fibrillation    Cancer  bladder  COPD, moderate  uses o2 at home  Emphysema    GERD (Gastroesophageal Reflux Disease)    HTN - Hypertension    Hx of Bladder Cancer  Late 1990's  Hyperlipidemia    Hyperlipidemia    Hypertension    Otosclerosis, left  [place]- left ear

## 2017-10-23 NOTE — ED ADULT NURSE NOTE - PMH
AAA (abdominal aortic aneurysm)  incisional hernia  AAA (Abdominal Aortic Aneurysm)    Anxiety    Arthritis of Knee    Atrial Fibrillation    Cancer  bladder  COPD, moderate  uses o2 at home  Emphysema    GERD (Gastroesophageal Reflux Disease)    HTN - Hypertension    Hx of Bladder Cancer  Late 1990's  Hyperlipidemia    Hyperlipidemia    Hypertension    Otosclerosis, left  [place]- left ear

## 2017-11-03 ENCOUNTER — MEDICATION RENEWAL (OUTPATIENT)
Age: 82
End: 2017-11-03

## 2017-11-09 ENCOUNTER — NON-APPOINTMENT (OUTPATIENT)
Age: 82
End: 2017-11-09

## 2017-11-09 ENCOUNTER — APPOINTMENT (OUTPATIENT)
Dept: CARDIOLOGY | Facility: CLINIC | Age: 82
End: 2017-11-09
Payer: MEDICARE

## 2017-11-09 VITALS
OXYGEN SATURATION: 98 % | BODY MASS INDEX: 27.32 KG/M2 | WEIGHT: 170 LBS | SYSTOLIC BLOOD PRESSURE: 160 MMHG | DIASTOLIC BLOOD PRESSURE: 90 MMHG | HEIGHT: 66 IN | HEART RATE: 86 BPM

## 2017-11-09 DIAGNOSIS — J44.9 CHRONIC OBSTRUCTIVE PULMONARY DISEASE, UNSPECIFIED: ICD-10-CM

## 2017-11-09 DIAGNOSIS — I50.32 CHRONIC DIASTOLIC (CONGESTIVE) HEART FAILURE: ICD-10-CM

## 2017-11-09 PROCEDURE — 99214 OFFICE O/P EST MOD 30 MIN: CPT

## 2017-11-09 PROCEDURE — 93000 ELECTROCARDIOGRAM COMPLETE: CPT

## 2017-11-15 ENCOUNTER — LABORATORY RESULT (OUTPATIENT)
Age: 82
End: 2017-11-15

## 2017-11-16 ENCOUNTER — OTHER (OUTPATIENT)
Age: 82
End: 2017-11-16

## 2017-11-16 LAB — NT-PROBNP SERPL-MCNC: 7092 PG/ML

## 2017-11-16 RX ORDER — METOPROLOL SUCCINATE 50 MG/1
50 TABLET, EXTENDED RELEASE ORAL DAILY
Qty: 90 | Refills: 3 | Status: ACTIVE | COMMUNITY
Start: 2017-05-22 | End: 1900-01-01

## 2017-11-17 ENCOUNTER — OTHER (OUTPATIENT)
Age: 82
End: 2017-11-17

## 2017-11-29 ENCOUNTER — APPOINTMENT (OUTPATIENT)
Dept: VASCULAR SURGERY | Facility: CLINIC | Age: 82
End: 2017-11-29
Payer: MEDICARE

## 2017-11-29 VITALS
SYSTOLIC BLOOD PRESSURE: 149 MMHG | DIASTOLIC BLOOD PRESSURE: 55 MMHG | TEMPERATURE: 98.3 F | WEIGHT: 169 LBS | HEART RATE: 52 BPM | HEIGHT: 66 IN | BODY MASS INDEX: 27.16 KG/M2

## 2017-11-29 DIAGNOSIS — R60.0 LOCALIZED EDEMA: ICD-10-CM

## 2017-11-29 PROCEDURE — 99213 OFFICE O/P EST LOW 20 MIN: CPT | Mod: 25

## 2017-11-30 ENCOUNTER — OTHER (OUTPATIENT)
Age: 82
End: 2017-11-30

## 2017-12-01 ENCOUNTER — MEDICATION RENEWAL (OUTPATIENT)
Age: 82
End: 2017-12-01

## 2017-12-01 RX ORDER — BUMETANIDE 2 MG/1
2 TABLET ORAL
Qty: 45 | Refills: 5 | Status: ACTIVE | COMMUNITY
Start: 2017-05-22 | End: 1900-01-01

## 2017-12-05 ENCOUNTER — OTHER (OUTPATIENT)
Age: 82
End: 2017-12-05

## 2017-12-13 ENCOUNTER — OTHER (OUTPATIENT)
Age: 82
End: 2017-12-13

## 2017-12-13 LAB
ANION GAP SERPL CALC-SCNC: 18 MMOL/L
BUN SERPL-MCNC: 94 MG/DL
CALCIUM SERPL-MCNC: 10.5 MG/DL
CHLORIDE SERPL-SCNC: 94 MMOL/L
CO2 SERPL-SCNC: 31 MMOL/L
CREAT SERPL-MCNC: 2.56 MG/DL
GLUCOSE SERPL-MCNC: 149 MG/DL
NT-PROBNP SERPL-MCNC: 4953 PG/ML
POTASSIUM SERPL-SCNC: 4.5 MMOL/L
SODIUM SERPL-SCNC: 143 MMOL/L

## 2017-12-14 ENCOUNTER — APPOINTMENT (OUTPATIENT)
Dept: CARDIOLOGY | Facility: CLINIC | Age: 82
End: 2017-12-14

## 2017-12-14 RX ORDER — BUDESONIDE 0.5 MG/2ML
0.5 INHALANT ORAL
Qty: 120 | Refills: 0 | Status: ACTIVE | COMMUNITY
Start: 2017-03-13

## 2017-12-14 RX ORDER — LEVALBUTEROL HYDROCHLORIDE 1.25 MG/3ML
1.25 SOLUTION RESPIRATORY (INHALATION)
Qty: 792 | Refills: 0 | Status: ACTIVE | COMMUNITY
Start: 2017-06-14

## 2017-12-28 ENCOUNTER — LABORATORY RESULT (OUTPATIENT)
Age: 82
End: 2017-12-28

## 2018-01-03 ENCOUNTER — OTHER (OUTPATIENT)
Age: 83
End: 2018-01-03

## 2018-01-08 ENCOUNTER — OTHER (OUTPATIENT)
Age: 83
End: 2018-01-08

## 2018-01-08 RX ORDER — AZITHROMYCIN 250 MG/1
250 TABLET, FILM COATED ORAL
Qty: 5 | Refills: 0 | Status: ACTIVE | COMMUNITY
Start: 2018-01-08 | End: 1900-01-01

## 2018-01-09 ENCOUNTER — INPATIENT (INPATIENT)
Facility: HOSPITAL | Age: 83
LOS: 4 days | DRG: 291 | End: 2018-01-14
Attending: INTERNAL MEDICINE | Admitting: INTERNAL MEDICINE
Payer: MEDICARE

## 2018-01-09 VITALS
WEIGHT: 171.96 LBS | HEART RATE: 76 BPM | OXYGEN SATURATION: 99 % | SYSTOLIC BLOOD PRESSURE: 137 MMHG | DIASTOLIC BLOOD PRESSURE: 69 MMHG | HEIGHT: 67 IN | RESPIRATION RATE: 22 BRPM | TEMPERATURE: 99 F

## 2018-01-09 DIAGNOSIS — Z98.890 OTHER SPECIFIED POSTPROCEDURAL STATES: Chronic | ICD-10-CM

## 2018-01-09 DIAGNOSIS — Z90.710 ACQUIRED ABSENCE OF BOTH CERVIX AND UTERUS: Chronic | ICD-10-CM

## 2018-01-09 LAB
ALBUMIN SERPL ELPH-MCNC: 3.4 G/DL — SIGNIFICANT CHANGE UP (ref 3.3–5)
ALP SERPL-CCNC: 60 U/L — SIGNIFICANT CHANGE UP (ref 40–120)
ALT FLD-CCNC: 21 U/L — SIGNIFICANT CHANGE UP (ref 12–78)
ANION GAP SERPL CALC-SCNC: 13 MMOL/L — SIGNIFICANT CHANGE UP (ref 5–17)
APTT BLD: 27.6 SEC — SIGNIFICANT CHANGE UP (ref 27.5–37.4)
AST SERPL-CCNC: 20 U/L — SIGNIFICANT CHANGE UP (ref 15–37)
BASOPHILS # BLD AUTO: 0 K/UL — SIGNIFICANT CHANGE UP (ref 0–0.2)
BASOPHILS NFR BLD AUTO: 0.7 % — SIGNIFICANT CHANGE UP (ref 0–2)
BILIRUB SERPL-MCNC: 0.7 MG/DL — SIGNIFICANT CHANGE UP (ref 0.2–1.2)
BUN SERPL-MCNC: 110 MG/DL — HIGH (ref 7–23)
CALCIUM SERPL-MCNC: 8.8 MG/DL — SIGNIFICANT CHANGE UP (ref 8.5–10.1)
CHLORIDE SERPL-SCNC: 96 MMOL/L — SIGNIFICANT CHANGE UP (ref 96–108)
CO2 SERPL-SCNC: 32 MMOL/L — HIGH (ref 22–31)
CREAT SERPL-MCNC: 3 MG/DL — HIGH (ref 0.5–1.3)
EOSINOPHIL # BLD AUTO: 0 K/UL — SIGNIFICANT CHANGE UP (ref 0–0.5)
EOSINOPHIL NFR BLD AUTO: 0.2 % — SIGNIFICANT CHANGE UP (ref 0–6)
GLUCOSE SERPL-MCNC: 120 MG/DL — HIGH (ref 70–99)
HCT VFR BLD CALC: 37.2 % — SIGNIFICANT CHANGE UP (ref 34.5–45)
HGB BLD-MCNC: 12.3 G/DL — SIGNIFICANT CHANGE UP (ref 11.5–15.5)
HMPV RNA SPEC QL NAA+PROBE: DETECTED
INR BLD: 1.27 RATIO — HIGH (ref 0.88–1.16)
LACTATE SERPL-SCNC: 2 MMOL/L — SIGNIFICANT CHANGE UP (ref 0.7–2)
LYMPHOCYTES # BLD AUTO: 0.4 K/UL — LOW (ref 1–3.3)
LYMPHOCYTES # BLD AUTO: 6.7 % — LOW (ref 13–44)
MCHC RBC-ENTMCNC: 30.3 PG — SIGNIFICANT CHANGE UP (ref 27–34)
MCHC RBC-ENTMCNC: 33 GM/DL — SIGNIFICANT CHANGE UP (ref 32–36)
MCV RBC AUTO: 91.9 FL — SIGNIFICANT CHANGE UP (ref 80–100)
MONOCYTES # BLD AUTO: 0.6 K/UL — SIGNIFICANT CHANGE UP (ref 0–0.9)
MONOCYTES NFR BLD AUTO: 9.3 % — HIGH (ref 1–9)
NEUTROPHILS # BLD AUTO: 5.6 K/UL — SIGNIFICANT CHANGE UP (ref 1.8–7.4)
NEUTROPHILS NFR BLD AUTO: 83.2 % — HIGH (ref 43–77)
NT-PROBNP SERPL-SCNC: HIGH PG/ML (ref 0–450)
PLATELET # BLD AUTO: 168 K/UL — SIGNIFICANT CHANGE UP (ref 150–400)
POTASSIUM SERPL-MCNC: 3.9 MMOL/L — SIGNIFICANT CHANGE UP (ref 3.5–5.3)
POTASSIUM SERPL-SCNC: 3.9 MMOL/L — SIGNIFICANT CHANGE UP (ref 3.5–5.3)
PROT SERPL-MCNC: 7 G/DL — SIGNIFICANT CHANGE UP (ref 6–8.3)
PROTHROM AB SERPL-ACNC: 13.9 SEC — HIGH (ref 9.8–12.7)
RAPID RVP RESULT: DETECTED
RBC # BLD: 4.05 M/UL — SIGNIFICANT CHANGE UP (ref 3.8–5.2)
RBC # FLD: 15.3 % — HIGH (ref 10.3–14.5)
SODIUM SERPL-SCNC: 141 MMOL/L — SIGNIFICANT CHANGE UP (ref 135–145)
WBC # BLD: 6.7 K/UL — SIGNIFICANT CHANGE UP (ref 3.8–10.5)
WBC # FLD AUTO: 6.7 K/UL — SIGNIFICANT CHANGE UP (ref 3.8–10.5)

## 2018-01-09 PROCEDURE — 93010 ELECTROCARDIOGRAM REPORT: CPT

## 2018-01-09 PROCEDURE — 71045 X-RAY EXAM CHEST 1 VIEW: CPT | Mod: 26

## 2018-01-09 PROCEDURE — 99285 EMERGENCY DEPT VISIT HI MDM: CPT

## 2018-01-09 RX ORDER — SODIUM CHLORIDE 9 MG/ML
1000 INJECTION INTRAMUSCULAR; INTRAVENOUS; SUBCUTANEOUS
Qty: 0 | Refills: 0 | Status: DISCONTINUED | OUTPATIENT
Start: 2018-01-09 | End: 2018-01-09

## 2018-01-09 RX ORDER — AZITHROMYCIN 500 MG/1
500 TABLET, FILM COATED ORAL ONCE
Qty: 0 | Refills: 0 | Status: COMPLETED | OUTPATIENT
Start: 2018-01-09 | End: 2018-01-09

## 2018-01-09 RX ORDER — CEFTRIAXONE 500 MG/1
1 INJECTION, POWDER, FOR SOLUTION INTRAMUSCULAR; INTRAVENOUS ONCE
Qty: 0 | Refills: 0 | Status: COMPLETED | OUTPATIENT
Start: 2018-01-09 | End: 2018-01-09

## 2018-01-09 RX ORDER — IPRATROPIUM/ALBUTEROL SULFATE 18-103MCG
3 AEROSOL WITH ADAPTER (GRAM) INHALATION ONCE
Qty: 0 | Refills: 0 | Status: COMPLETED | OUTPATIENT
Start: 2018-01-09 | End: 2018-01-09

## 2018-01-09 RX ORDER — ONDANSETRON 8 MG/1
4 TABLET, FILM COATED ORAL ONCE
Qty: 0 | Refills: 0 | Status: COMPLETED | OUTPATIENT
Start: 2018-01-09 | End: 2018-01-10

## 2018-01-09 RX ADMIN — Medication 125 MILLIGRAM(S): at 22:12

## 2018-01-09 RX ADMIN — AZITHROMYCIN 255 MILLIGRAM(S): 500 TABLET, FILM COATED ORAL at 22:47

## 2018-01-09 NOTE — ED ADULT NURSE NOTE - PMH
AAA (abdominal aortic aneurysm)  incisional hernia  AAA (Abdominal Aortic Aneurysm)    Anxiety    Arthritis of Knee    Atrial Fibrillation    Cancer  bladder  COPD, moderate  uses o2 at home  Emphysema    GERD (Gastroesophageal Reflux Disease)    HTN - Hypertension    Hx of Bladder Cancer  Late 1990's  Hyperlipidemia    Hyperlipidemia    Hypertension    Otosclerosis, left  [place]- left ear

## 2018-01-09 NOTE — ED PROVIDER NOTE - CARE PLAN
Principal Discharge DX:	Pneumonia due to infectious organism, unspecified laterality, unspecified part of lung  Secondary Diagnosis:	Human metapneumovirus (hMPV) pneumonia  Secondary Diagnosis:	Shortness of breath

## 2018-01-09 NOTE — ED ADULT TRIAGE NOTE - NS ED TRIAGE AVPU SCALE
Alert-The patient is alert, awake and responds to voice. The patient is oriented to time, place, and person. The triage nurse is able to obtain subjective information. - - -

## 2018-01-09 NOTE — ED PROVIDER NOTE - PMH
AAA (abdominal aortic aneurysm)  incisional hernia  AAA (Abdominal Aortic Aneurysm)    Anxiety    Arthritis of Knee    Atrial Fibrillation    Cancer  bladder  COPD, moderate  uses o2 at home  Emphysema    GERD (Gastroesophageal Reflux Disease)    HTN - Hypertension    Hx of Bladder Cancer  Late 1990's  Hyperlipidemia    Hyperlipidemia    Hypertension    Otosclerosis, left  [place]- left ear

## 2018-01-09 NOTE — ED ADULT TRIAGE NOTE - CHIEF COMPLAINT QUOTE
difficulty breathing since afternoon- on home oxygen, patient had 2 nebuliser treatment to day and had a coughing fit and turned purple, patient on antibiotics since yesterday for bronchitis

## 2018-01-09 NOTE — ED ADULT NURSE NOTE - OBJECTIVE STATEMENT
Pt presents to ED from home after being on abx for two days for bronchitis. Pt was on her nebulizer tonight and began going into a wet coughing "fit" according to pt and daughter ( her primary caretaker). Pt has hx of COPD/ CHF, hx of smoking for 60yrs. pt has b/l pedal edema (pitting) pt has since quit. pt is afebrile, nervous to take another nebulizer treatment at this time. Pt presents with 20g IV in right hand placed by EMS prior to arrival, site is clean and dry, patent. Bloods obtained and sent to lab.

## 2018-01-09 NOTE — ED PROVIDER NOTE - OBJECTIVE STATEMENT
89 yo F p/w cough, congestion x past ~ 3 days. Pt seen by urgent care, on zithromax x past 2 days. Now with inc dyspnea, gen malaise. Pt had bad coughing fit PTA, reportedly was briefly cyanotic. No chest pain. no abd pain. No vomiting /diarrhea. No agg/allev factors. No recent trauma. No recent travel. Some recent inc LE edema, being followed by outpt md with recent increase in lasix. No other inj or co.

## 2018-01-09 NOTE — ED PROVIDER NOTE - ENMT, MLM
Airway patent, Nasal mucosa clear. Mouth with normal mucosa. Throat has no vesicles, no oropharyngeal exudates and uvula is midline. MM Moist. Non-toxic appearing. Neck supple

## 2018-01-10 ENCOUNTER — OTHER (OUTPATIENT)
Age: 83
End: 2018-01-10

## 2018-01-10 DIAGNOSIS — N17.9 ACUTE KIDNEY FAILURE, UNSPECIFIED: ICD-10-CM

## 2018-01-10 DIAGNOSIS — K21.9 GASTRO-ESOPHAGEAL REFLUX DISEASE WITHOUT ESOPHAGITIS: ICD-10-CM

## 2018-01-10 DIAGNOSIS — I50.9 HEART FAILURE, UNSPECIFIED: ICD-10-CM

## 2018-01-10 DIAGNOSIS — J44.9 CHRONIC OBSTRUCTIVE PULMONARY DISEASE, UNSPECIFIED: ICD-10-CM

## 2018-01-10 DIAGNOSIS — J18.9 PNEUMONIA, UNSPECIFIED ORGANISM: ICD-10-CM

## 2018-01-10 DIAGNOSIS — J44.1 CHRONIC OBSTRUCTIVE PULMONARY DISEASE WITH (ACUTE) EXACERBATION: ICD-10-CM

## 2018-01-10 DIAGNOSIS — I48.91 UNSPECIFIED ATRIAL FIBRILLATION: ICD-10-CM

## 2018-01-10 DIAGNOSIS — E78.5 HYPERLIPIDEMIA, UNSPECIFIED: ICD-10-CM

## 2018-01-10 DIAGNOSIS — I10 ESSENTIAL (PRIMARY) HYPERTENSION: ICD-10-CM

## 2018-01-10 DIAGNOSIS — N18.9 CHRONIC KIDNEY DISEASE, UNSPECIFIED: ICD-10-CM

## 2018-01-10 DIAGNOSIS — J12.3 HUMAN METAPNEUMOVIRUS PNEUMONIA: ICD-10-CM

## 2018-01-10 LAB
ANION GAP SERPL CALC-SCNC: 9 MMOL/L — SIGNIFICANT CHANGE UP (ref 5–17)
BUN SERPL-MCNC: 111 MG/DL — HIGH (ref 7–23)
CALCIUM SERPL-MCNC: 9.1 MG/DL — SIGNIFICANT CHANGE UP (ref 8.5–10.1)
CHLORIDE SERPL-SCNC: 95 MMOL/L — LOW (ref 96–108)
CO2 SERPL-SCNC: 36 MMOL/L — HIGH (ref 22–31)
CREAT SERPL-MCNC: 3 MG/DL — HIGH (ref 0.5–1.3)
CRP SERPL-MCNC: 1.1 MG/DL — HIGH (ref 0–0.4)
GLUCOSE SERPL-MCNC: 225 MG/DL — HIGH (ref 70–99)
HCT VFR BLD CALC: 36.2 % — SIGNIFICANT CHANGE UP (ref 34.5–45)
HGB BLD-MCNC: 12 G/DL — SIGNIFICANT CHANGE UP (ref 11.5–15.5)
MAGNESIUM SERPL-MCNC: 2.8 MG/DL — HIGH (ref 1.6–2.6)
MCHC RBC-ENTMCNC: 31.3 PG — SIGNIFICANT CHANGE UP (ref 27–34)
MCHC RBC-ENTMCNC: 33.2 GM/DL — SIGNIFICANT CHANGE UP (ref 32–36)
MCV RBC AUTO: 94 FL — SIGNIFICANT CHANGE UP (ref 80–100)
PLATELET # BLD AUTO: 158 K/UL — SIGNIFICANT CHANGE UP (ref 150–400)
POTASSIUM SERPL-MCNC: 4.2 MMOL/L — SIGNIFICANT CHANGE UP (ref 3.5–5.3)
POTASSIUM SERPL-SCNC: 4.2 MMOL/L — SIGNIFICANT CHANGE UP (ref 3.5–5.3)
PROCALCITONIN SERPL-MCNC: 0.14 NG/ML — HIGH (ref 0–0.04)
RBC # BLD: 3.85 M/UL — SIGNIFICANT CHANGE UP (ref 3.8–5.2)
RBC # FLD: 15.8 % — HIGH (ref 10.3–14.5)
SODIUM SERPL-SCNC: 140 MMOL/L — SIGNIFICANT CHANGE UP (ref 135–145)
WBC # BLD: 5.4 K/UL — SIGNIFICANT CHANGE UP (ref 3.8–10.5)
WBC # FLD AUTO: 5.4 K/UL — SIGNIFICANT CHANGE UP (ref 3.8–10.5)

## 2018-01-10 PROCEDURE — 93970 EXTREMITY STUDY: CPT | Mod: 26

## 2018-01-10 PROCEDURE — 99223 1ST HOSP IP/OBS HIGH 75: CPT

## 2018-01-10 PROCEDURE — 71250 CT THORAX DX C-: CPT | Mod: 26

## 2018-01-10 PROCEDURE — 93306 TTE W/DOPPLER COMPLETE: CPT | Mod: 26

## 2018-01-10 RX ORDER — BUDESONIDE, MICRONIZED 100 %
0.5 POWDER (GRAM) MISCELLANEOUS
Qty: 0 | Refills: 0 | Status: DISCONTINUED | OUTPATIENT
Start: 2018-01-10 | End: 2018-01-14

## 2018-01-10 RX ORDER — MAGNESIUM OXIDE 400 MG ORAL TABLET 241.3 MG
1 TABLET ORAL
Qty: 0 | Refills: 0 | COMMUNITY

## 2018-01-10 RX ORDER — METOPROLOL TARTRATE 50 MG
1 TABLET ORAL
Qty: 0 | Refills: 0 | COMMUNITY

## 2018-01-10 RX ORDER — SODIUM CHLORIDE 9 MG/ML
1000 INJECTION, SOLUTION INTRAVENOUS
Qty: 0 | Refills: 0 | Status: DISCONTINUED | OUTPATIENT
Start: 2018-01-10 | End: 2018-01-13

## 2018-01-10 RX ORDER — LEVALBUTEROL 1.25 MG/.5ML
0.5 SOLUTION, CONCENTRATE RESPIRATORY (INHALATION)
Qty: 0 | Refills: 0 | COMMUNITY

## 2018-01-10 RX ORDER — BUMETANIDE 0.25 MG/ML
1 INJECTION INTRAMUSCULAR; INTRAVENOUS EVERY 12 HOURS
Qty: 0 | Refills: 0 | Status: DISCONTINUED | OUTPATIENT
Start: 2018-01-10 | End: 2018-01-13

## 2018-01-10 RX ORDER — TIOTROPIUM BROMIDE 18 UG/1
1 CAPSULE ORAL; RESPIRATORY (INHALATION)
Qty: 0 | Refills: 0 | COMMUNITY

## 2018-01-10 RX ORDER — PANTOPRAZOLE SODIUM 20 MG/1
40 TABLET, DELAYED RELEASE ORAL
Qty: 0 | Refills: 0 | Status: DISCONTINUED | OUTPATIENT
Start: 2018-01-10 | End: 2018-01-14

## 2018-01-10 RX ORDER — SODIUM CHLORIDE 9 MG/ML
1000 INJECTION INTRAMUSCULAR; INTRAVENOUS; SUBCUTANEOUS
Qty: 0 | Refills: 0 | Status: DISCONTINUED | OUTPATIENT
Start: 2018-01-10 | End: 2018-01-10

## 2018-01-10 RX ORDER — BUMETANIDE 0.25 MG/ML
2 INJECTION INTRAMUSCULAR; INTRAVENOUS DAILY
Qty: 0 | Refills: 0 | Status: DISCONTINUED | OUTPATIENT
Start: 2018-01-10 | End: 2018-01-10

## 2018-01-10 RX ORDER — IPRATROPIUM/ALBUTEROL SULFATE 18-103MCG
3 AEROSOL WITH ADAPTER (GRAM) INHALATION EVERY 6 HOURS
Qty: 0 | Refills: 0 | Status: DISCONTINUED | OUTPATIENT
Start: 2018-01-10 | End: 2018-01-14

## 2018-01-10 RX ORDER — AZITHROMYCIN 500 MG/1
500 TABLET, FILM COATED ORAL EVERY 24 HOURS
Qty: 0 | Refills: 0 | Status: DISCONTINUED | OUTPATIENT
Start: 2018-01-10 | End: 2018-01-10

## 2018-01-10 RX ORDER — BUDESONIDE, MICRONIZED 100 %
2 POWDER (GRAM) MISCELLANEOUS
Qty: 0 | Refills: 0 | COMMUNITY

## 2018-01-10 RX ORDER — ACETAMINOPHEN 500 MG
650 TABLET ORAL EVERY 6 HOURS
Qty: 0 | Refills: 0 | Status: DISCONTINUED | OUTPATIENT
Start: 2018-01-10 | End: 2018-01-14

## 2018-01-10 RX ORDER — SIMVASTATIN 20 MG/1
40 TABLET, FILM COATED ORAL AT BEDTIME
Qty: 0 | Refills: 0 | Status: DISCONTINUED | OUTPATIENT
Start: 2018-01-10 | End: 2018-01-14

## 2018-01-10 RX ORDER — HEPARIN SODIUM 5000 [USP'U]/ML
5000 INJECTION INTRAVENOUS; SUBCUTANEOUS EVERY 12 HOURS
Qty: 0 | Refills: 0 | Status: DISCONTINUED | OUTPATIENT
Start: 2018-01-10 | End: 2018-01-10

## 2018-01-10 RX ORDER — METOPROLOL TARTRATE 50 MG
25 TABLET ORAL DAILY
Qty: 0 | Refills: 0 | Status: DISCONTINUED | OUTPATIENT
Start: 2018-01-10 | End: 2018-01-14

## 2018-01-10 RX ORDER — TIOTROPIUM BROMIDE 18 UG/1
1 CAPSULE ORAL; RESPIRATORY (INHALATION) DAILY
Qty: 0 | Refills: 0 | Status: DISCONTINUED | OUTPATIENT
Start: 2018-01-10 | End: 2018-01-14

## 2018-01-10 RX ORDER — APIXABAN 2.5 MG/1
2.5 TABLET, FILM COATED ORAL EVERY 12 HOURS
Qty: 0 | Refills: 0 | Status: DISCONTINUED | OUTPATIENT
Start: 2018-01-10 | End: 2018-01-13

## 2018-01-10 RX ORDER — IPRATROPIUM BROMIDE 0.2 MG/ML
1 SOLUTION, NON-ORAL INHALATION
Qty: 0 | Refills: 0 | COMMUNITY

## 2018-01-10 RX ORDER — APIXABAN 2.5 MG/1
1 TABLET, FILM COATED ORAL
Qty: 0 | Refills: 0 | COMMUNITY

## 2018-01-10 RX ADMIN — Medication 25 MILLIGRAM(S): at 06:26

## 2018-01-10 RX ADMIN — Medication 0.5 MILLIGRAM(S): at 21:09

## 2018-01-10 RX ADMIN — Medication 3 MILLILITER(S): at 21:09

## 2018-01-10 RX ADMIN — Medication 0.5 MILLIGRAM(S): at 07:42

## 2018-01-10 RX ADMIN — CEFTRIAXONE 100 GRAM(S): 500 INJECTION, POWDER, FOR SOLUTION INTRAMUSCULAR; INTRAVENOUS at 01:43

## 2018-01-10 RX ADMIN — Medication 40 MILLIGRAM(S): at 06:26

## 2018-01-10 RX ADMIN — SIMVASTATIN 40 MILLIGRAM(S): 20 TABLET, FILM COATED ORAL at 22:29

## 2018-01-10 RX ADMIN — PANTOPRAZOLE SODIUM 40 MILLIGRAM(S): 20 TABLET, DELAYED RELEASE ORAL at 06:26

## 2018-01-10 RX ADMIN — Medication 40 MILLIGRAM(S): at 22:28

## 2018-01-10 RX ADMIN — Medication 40 MILLIGRAM(S): at 13:17

## 2018-01-10 RX ADMIN — Medication 100 MILLIGRAM(S): at 13:18

## 2018-01-10 RX ADMIN — SODIUM CHLORIDE 50 MILLILITER(S): 9 INJECTION, SOLUTION INTRAVENOUS at 22:29

## 2018-01-10 RX ADMIN — Medication 3 MILLILITER(S): at 03:47

## 2018-01-10 RX ADMIN — ONDANSETRON 4 MILLIGRAM(S): 8 TABLET, FILM COATED ORAL at 00:03

## 2018-01-10 RX ADMIN — APIXABAN 2.5 MILLIGRAM(S): 2.5 TABLET, FILM COATED ORAL at 17:09

## 2018-01-10 RX ADMIN — SODIUM CHLORIDE 50 MILLILITER(S): 9 INJECTION, SOLUTION INTRAVENOUS at 17:12

## 2018-01-10 RX ADMIN — Medication 3 MILLILITER(S): at 14:22

## 2018-01-10 RX ADMIN — Medication 3 MILLILITER(S): at 07:42

## 2018-01-10 RX ADMIN — BUMETANIDE 1 MILLIGRAM(S): 0.25 INJECTION INTRAMUSCULAR; INTRAVENOUS at 23:44

## 2018-01-10 RX ADMIN — Medication 100 MILLIGRAM(S): at 06:26

## 2018-01-10 RX ADMIN — Medication 600 MILLIGRAM(S): at 06:26

## 2018-01-10 RX ADMIN — TIOTROPIUM BROMIDE 1 CAPSULE(S): 18 CAPSULE ORAL; RESPIRATORY (INHALATION) at 13:17

## 2018-01-10 RX ADMIN — Medication 600 MILLIGRAM(S): at 17:09

## 2018-01-10 RX ADMIN — Medication 100 MILLIGRAM(S): at 22:28

## 2018-01-10 NOTE — CONSULT NOTE ADULT - PROBLEM SELECTOR RECOMMENDATION 9
viral panel noted  isolation precs  COPD regimen  will hold off on ABX for now  will check crp and procalcitonin  CXR does not show consolidation or PNA suggestion  WBC normal on admission  will check CT chest for copd eval and progression and for any signs of mucus plugging  supportive regimen  multi organ failure with HF  prognosis guarded  pall care eval with goals of care discussion

## 2018-01-10 NOTE — H&P ADULT - PMH
AAA (abdominal aortic aneurysm)  incisional hernia  AAA (Abdominal Aortic Aneurysm)    Anxiety    Arthritis of Knee    Atrial Fibrillation    Cancer  bladder  COPD, moderate  uses o2 at home  Emphysema    GERD (Gastroesophageal Reflux Disease)    HTN - Hypertension    Hx of Bladder Cancer  Late 1990's  Hyperlipidemia    Hyperlipidemia    Hypertension    Otosclerosis, left  [place]- left ear

## 2018-01-10 NOTE — PATIENT PROFILE ADULT. - SOURCE OF INFORMATION, PROFILE
family/patient dtr assisted with home med reconciliation & History. Copy forward H&P 1/10/18/patient/family

## 2018-01-10 NOTE — CONSULT NOTE ADULT - PROBLEM SELECTOR RECOMMENDATION 5
CKD  on lasix at home  s/p IVF  crackles on exam  LE edema  CXR with PVC  will hold off on further IVF  will hold off on LASIX  will monitor I and O  volume overload is overt, however, pt is in a challenging situation, multi organ failure with HF  no good options available  supportive measures  careful assessment of clinical / volume status and need for diuresis vs IVF  pall care eval

## 2018-01-10 NOTE — H&P ADULT - HISTORY OF PRESENT ILLNESS
89 yo F with PMH of HTN CHF COPD on home O2, CKD who was recently d/c'd from  rehab, p/w cough, congestion x past ~ 3 days. Pt seen by urgent care, on zithromax x past 2 days. Now with inc dyspnea, gen malaise. Pt had bad coughing fit PTA, reportedly was briefly cyanotic. No chest pain. no abd pain. No vomiting /diarrhea. No agg/allev factors. No recent trauma. No recent travel. Some recent inc LE edema, being followed by outpt md with recent increase in lasix. No other inj or co. Patient denies fevers/chills/cp. She reports that her daughter had bronchitis recently. She lives with her daughter.

## 2018-01-10 NOTE — CONSULT NOTE ADULT - PROBLEM SELECTOR RECOMMENDATION 4
COPD  advanced COPD  on home o2  keep sat > 88 pct  will provide Rx for congestion  will cont NEBS, Systemic Steroids, Inhaler therapy, manage comorbidities  prognosis very poor  will check ct chest to eval progression of lung disease and eval for Lower resp tract infection  will check CRP and Procalcitonin  discussed plan of care with the patient

## 2018-01-10 NOTE — GOALS OF CARE CONVERSATION - PERSONAL ADVANCE DIRECTIVE - CONVERSATION DETAILS
Spoke to pt regarding advance directives . We discussed resuscitation and she states that she absolutely knows what CPR is but wants to think about it before making any decisions.

## 2018-01-10 NOTE — PATIENT PROFILE ADULT. - PMH
AAA (abdominal aortic aneurysm)  incisional hernia  AAA (Abdominal Aortic Aneurysm)    Anxiety    Arthritis of Knee    Atrial Fibrillation    Cancer  bladder  COPD, moderate  uses o2 at home  Emphysema    GERD (Gastroesophageal Reflux Disease)    HTN - Hypertension    Hx of Bladder Cancer  Late 1990's  Hyperlipidemia    Hyperlipidemia    Hypertension    Otosclerosis, left  [place]- left ear

## 2018-01-10 NOTE — CONSULT NOTE ADULT - ASSESSMENT
·	FITZ, CKD 3: Prerenal azotemia  ·	h/o CHF  ·	? Viral Pneumonia  ·	Hypertension    Hold diuretics. Gentle IV hydration. Check repeat labs and monitor renal function trend.   Encourage PO intake as tolerated. Labs as ordered. Get renal sonogram. Avoid nephrotoxic meds as possible.   Avoid ACEI, ARB and NSAIDS. Monitor input and output. Monitor BP trend. Titrate BP meds as needed. Salt restriction.   Further recommendations pending clinical course. Thank you for the courtesy of this referral.

## 2018-01-10 NOTE — ED ADULT NURSE REASSESSMENT NOTE - NS ED NURSE REASSESS COMMENT FT1
pt is alert with some confusion which daughter states has been getting worse lately. Pt is anxious and upset over sons death a few years prior, sat with pt while giving her duoneb and discussed pt's condition, pt is calmer now and has stopped crying.

## 2018-01-10 NOTE — CONSULT NOTE ADULT - PROBLEM SELECTOR RECOMMENDATION 3
HF  I and O  CKD and HF  Multi organ failure  old TTE shows Pulm HTN, valv heart disease, HFpEF  tele unit admission  LE edema and PVC on CXR  and crackles on exam, will dc IVF for now  may need lasix, was on lasix at HOME  prognosis poor  pall care eval for goals of care discussion

## 2018-01-10 NOTE — CONSULT NOTE ADULT - SUBJECTIVE AND OBJECTIVE BOX
Patient is a 88y old  Female who presents with a chief complaint of congestion (10 Pieter 2018 10:56)    HPI:  89 yo F with PMH of HTN CHF COPD on home O2, CKD who was recently d/c'd from  rehab, p/w cough, congestion x past ~ 3 days. Pt seen by urgent care, on zithromax x past 2 days. Now with inc dyspnea, gen malaise. Pt had bad coughing fit PTA, reportedly was briefly cyanotic. No chest pain. no abd pain. No vomiting /diarrhea. No agg/allev factors. No recent trauma. No recent travel. Some recent inc LE edema, being followed by outpt md with recent increase in lasix. No other inj or co. Patient denies fevers/chills/cp. She reports that her daughter had bronchitis recently. She lives with her daughter. (10 Pieter 2018 10:56)    Renal consult called for FITZ. Pt was on increased dose of diuretics. Edema much better as per pt.   No urinary complaints. Family at bedside.       PAST MEDICAL HISTORY:  Cancer  Hyperlipidemia  COPD, moderate  AAA (abdominal aortic aneurysm)  Hypertension  Anxiety  Otosclerosis, left  GERD (Gastroesophageal Reflux Disease)  AAA (Abdominal Aortic Aneurysm)  Cataract, bilateral  Arthritis of Knee  Hx of Bladder Cancer  Hyperlipidemia  Atrial Fibrillation  HTN - Hypertension  Emphysema      PAST SURGICAL HISTORY:  S/P breast lumpectomy  S/P hysterectomy  S/P cataract surgery  S/P AAA repair  History of Dilatation and Curettage x3  Transurethral resection of bladder tumor x2  History of Total Hysterectomy with Removal of Both Tubes and Ovaries  History of Tonsillectomy      FAMILY HISTORY:  No pertinent family history in first degree relatives      SOCIAL HISTORY: former smoker, no alcohol use    Allergies    adhesives (Rash)  latex (Rash)  penicillin (Rash)  penicillins (Urticaria; Rash)  shellfish (Rash)    Intolerances      Home Medications:  budesonide 0.5 mg/2 mL inhalation suspension: 2 milliliter(s) inhaled 2 times a day (23 Oct 2017 13:53)  bumetanide 2 mg oral tablet: 1 tab(s) orally once a day (23 Oct 2017 13:53)  Eliquis 2.5 mg oral tablet: 1 tab(s) orally 2 times a day (23 Oct 2017 13:53)  ipratropium 18 mcg/inh inhalation aerosol: 1 puff(s) inhaled 3 to 4 times a day (23 Oct 2017 13:53)  levalbuterol 1.25 mg/0.5 mL inhalation solution: 0.5 milliliter(s) inhaled 3 times a day (23 Oct 2017 14:03)  magnesium oxide 400 mg (240 mg elemental magnesium) oral tablet: 1 tab(s) orally once a day (23 Oct 2017 14:03)  metoprolol succinate 25 mg oral tablet, extended release: 1 tab(s) orally once a day (23 Oct 2017 14:03)  nystatin 100,000 units/g topical powder: 1 application topically every 8 hours, As needed, antifungal (23 Oct 2017 13:53)  pantoprazole 40 mg oral delayed release tablet: 1 tab(s) orally once a day (23 Oct 2017 13:53)  predniSONE: 15 milligram(s) orally once a day (23 Oct 2017 14:03)  simvastatin 40 mg oral tablet: 1 tab(s) orally once a day (at bedtime) (23 Oct 2017 13:53)  Spiriva 18 mcg inhalation capsule: 1 cap(s) inhaled once a day (23 Oct 2017 14:03)  spironolactone 25 mg oral tablet: 0.5 tab(s) orally once a day (23 Oct 2017 13:53)    MEDICATIONS  (STANDING):  ALBUTerol/ipratropium for Nebulization 3 milliLiter(s) Nebulizer every 6 hours  apixaban 2.5 milliGRAM(s) Oral every 12 hours  benzonatate 100 milliGRAM(s) Oral three times a day  buDESOnide   0.5 milliGRAM(s) Respule 0.5 milliGRAM(s) Inhalation two times a day  guaiFENesin  milliGRAM(s) Oral every 12 hours  methylPREDNISolone sodium succinate Injectable 40 milliGRAM(s) IV Push every 8 hours  metoprolol succinate ER 25 milliGRAM(s) Oral daily  pantoprazole    Tablet 40 milliGRAM(s) Oral before breakfast  simvastatin 40 milliGRAM(s) Oral at bedtime  tiotropium 18 MICROgram(s) Capsule 1 Capsule(s) Inhalation daily    MEDICATIONS  (PRN):  acetaminophen   Tablet 650 milliGRAM(s) Oral every 6 hours PRN For Temp greater than 38 C (100.4 F)  acetaminophen   Tablet. 650 milliGRAM(s) Oral every 6 hours PRN Mild Pain (1 - 3)  guaiFENesin    Syrup 100 milliGRAM(s) Oral every 6 hours PRN Cough      REVIEW OF SYSTEMS:  General: + dyspnea  Respiratory: + SOB  Cardiovascular: No CP or Palpitations	  Gastrointestinal: No nausea, Vomiting. No diarrhea  Genitourinary: No urinary complaints	  Musculoskeletal: + leg swelling, No new rash or lesions	  all other systems negative    T(F): 98.3 (01-10-18 @ 12:48), Max: 99 (01-09-18 @ 21:10)  HR: 74 (01-10-18 @ 14:22) (73 - 84)  BP: 129/49 (01-10-18 @ 12:48) (117/47 - 137/69)  RR: 20 (01-10-18 @ 12:48) (20 - 24)  SpO2: 96% (01-10-18 @ 14:22) (95% - 100%)  Wt(kg): --    PHYSICAL EXAM:  General: sob  Respiratory: b/l air entry  Cardiovascular: S1 S2  Gastrointestinal: soft  Extremities: edema        01-10    140  |  95<L>  |  111<H>  ----------------------------<  225<H>  4.2   |  36<H>  |  3.00<H>    Ca    9.1      10 Pieter 2018 08:28  Mg     2.8     01-10    TPro  7.0  /  Alb  3.4  /  TBili  0.7  /  DBili  x   /  AST  20  /  ALT  21  /  AlkPhos  60  01-09                          12.0   5.4   )-----------( 158      ( 10 Pieter 2018 08:28 )             36.2       Potassium, Serum: 4.2 mmol/L (01-10 @ 08:28)  Blood Urea Nitrogen, Serum: 111 mg/dL (01-10 @ 08:28)  Calcium, Total Serum: 9.1 mg/dL (01-10 @ 08:28)  Hemoglobin: 12.0 g/dL (01-10 @ 08:28)      Creatinine, Serum: 3.00 (01-10 @ 08:28)  Creatinine, Serum: 3.00 (01-09 @ 22:39)        LIVER FUNCTIONS - ( 09 Jan 2018 22:39 )  Alb: 3.4 g/dL / Pro: 7.0 g/dL / ALK PHOS: 60 U/L / ALT: 21 U/L / AST: 20 U/L / GGT: x               < from: CT Chest No Cont (01.10.18 @ 09:25) >  EXAM:  CT CHEST                            PROCEDURE DATE:  01/10/2018          INTERPRETATION:  CLINICAL INFORMATION:  Shortness of breath, evaluate for   chronic lung disease.    PROCEDURE:  Using multislice helical CT, 2.5 mm sections were obtained   from the thoracic inlet through the lung bases.  Multiplanar reformatted images.    COMPARISON: Chest 3/7/2015.    FINDINGS:      There is minimal scarring/fibrosis inferior left lingula and posterior   left lower lobes.  There is atelectasis and/or scarring at the posterior right lower lobe.    There is a 5 mm nodule peripherally right lung apex, stable in   appearance. There are scattered tiny subpleural nodular opacities   bilaterally, stable in appearance.    The central airways remain patent.    No pleural effusion is noted.    There are shotty prevascular, pretracheal mediastinal lymph nodes,   measuring up to 10 mm; stable in appearance.  No enlarged hilar lymphadenopathy is noted.    There is arteriosclerotic calcification of thethoracic aorta.  Coronary artery calcifications are present.     There is an indeterminate subcentimeter hypodense lesion anteriorly right   hepatic lobe.   There is a stable appearance of the 2 cm low-density left adrenal nodule.  There is a small exophytic cyst upper pole right kidney.  There is a subcentimeter, indeterminate hypodense lesion medially at the   upper pole of the right kidney.    Impression:    Stable pulmonary nodule right upper lobe.    Stable mediastinal lymphadenopathy.    Noacute pulmonary process demonstrated.    Other findings as discussed.    < end of copied text >

## 2018-01-10 NOTE — CONSULT NOTE ADULT - ASSESSMENT
87 yo F with PMH of afib on eliquis, HTN CHF COPD on home O2, CKD who was recently d/c'd from  rehab, p/w cough, congestion. Admitted with hMPV pna, COPD exacerbation, FITZ, heart failure. 87 yo F with PMH of afib on eliquis, HTN CHF COPD on home O2, CKD who was recently d/c'd from  rehab, p/w cough, congestion. Admitted with hMPV pna, COPD exacerbation, FITZ, heart failure.    - Cough and congestion likely 2/2 to viral infection. Monitor I's and O's. Will speak to Dr. Sameer Dey at [facility] regarding treatment of patient's diastolic dysfunction.   - Consider change to warfarin from eliquis for a fib due to low cr clearance  - No clear evidence of acute ischemia  - No acute changes on EKG compared to previous  - Previous ECHO: April 2017, normal rt ventricle size and function, mod pulm htn, hyperdynamic left ventricle  - BP well controlled, continue home BP meds, monitor routine hemodynamics  - Monitor and replete lytes, keep K>4, Mg>2  - Other cardiovascular workup will depend on clinical course.  - All other workup per primary team  - Will follow 87 yo F with PMH of afib on eliquis, HTN CHF COPD on home O2, CKD who was recently d/c'd from  rehab, p/w cough, congestion. Admitted with hMPV pna, COPD exacerbation, FITZ, heart failure.    - Cough and congestion likely 2/2 to viral infection. Monitor I's and O's. Spoke to Dr. Sameer Dey at [facility] regarding treatment of patient's diastolic dysfunction. Stated start bumetanide 2 mg IV daily and see how she diuresis.   - Consider change to warfarin from eliquis for a fib due to low cr clearance  - No clear evidence of acute ischemia  - No acute changes on EKG compared to previous  - Previous ECHO: April 2017, normal rt ventricle size and function, mod pulm htn, hyperdynamic left ventricle  - BP well controlled, continue home BP meds, monitor routine hemodynamics  - Monitor and replete lytes, keep K>4, Mg>2  - Other cardiovascular workup will depend on clinical course.  - All other workup per primary team  - Will follow 87 yo F with PMH of afib on eliquis, HTN CHF COPD on home O2, CKD who was recently d/c'd from  rehab, p/w cough, congestion. Admitted with hMPV pna, COPD exacerbation, FITZ, heart failure.    - Cough and congestion likely 2/2 to viral infection. Monitor I's and O's. Spoke to IZAIAH Jay at [facility] who spoke to Dr. Sameer Dey at [facility] regarding treatment of patient's diastolic dysfunction. Stated to start bumetanide 2 mg IV daily and see how she diuresis.   - Consider change to warfarin from eliquis for a fib due to low cr clearance  - No clear evidence of acute ischemia  - No acute changes on EKG compared to previous  - Previous ECHO: April 2017, normal rt ventricle size and function, mod pulm htn, hyperdynamic left ventricle  - BP well controlled, continue home BP meds, monitor routine hemodynamics  - Monitor and replete lytes, keep K>4, Mg>2  - Other cardiovascular workup will depend on clinical course.  - All other workup per primary team  - Will follow

## 2018-01-10 NOTE — PATIENT PROFILE ADULT. - HEALTHCARE INFORMATION NEEDED, PROFILE
none per dtr she pt has  part time aides(private pay) to assist pt when dtr goes to work, pt has started application for medicaid per dtr.

## 2018-01-10 NOTE — H&P ADULT - PROBLEM SELECTOR PLAN 1
Admit  Droplet precautions  Nebulizers  Cough suppressants  Oxygen  Supportive care  Pulmonary consult

## 2018-01-10 NOTE — CONSULT NOTE ADULT - SUBJECTIVE AND OBJECTIVE BOX
Date/Time Patient Seen:  		  Referring MD:   Data Reviewed	       Patient is a 88y old  Female who presents with a chief complaint of sob and congestion    Subjective/HPI  recently discharged from [facility]  pt was in [facility] prior to [facility]  pt lives with dtr  now with URI sx and COPD ex and has multiple organ failure  pt is alert, able to provide history  heavy ex smoker  on home o2 for advanced COPD  sees Dr. Shi Patel for Pulmonary Medicine  pt denies fever or chills at present  pt denies recent travel    in ER labs, imaging and vs/HD reviewed    87 yo F p/w cough, congestion x past ~ 3 days. Pt seen by urgent care, on zithromax x past 2 days. Now with inc dyspnea, gen malaise. Pt had bad coughing fit PTA, reportedly was briefly cyanotic. No chest pain. no abd pain. No vomiting /diarrhea. No agg/allev factors. No recent trauma. No recent travel. Some recent inc LE edema, being followed by outpt md with recent increase in lasix. No other inj or co.     PAST MEDICAL & SURGICAL HISTORY:  Cancer: bladder  Hyperlipidemia  COPD, moderate: uses o2 at home  AAA (abdominal aortic aneurysm): incisional hernia  Hypertension  Anxiety  Otosclerosis, left: [place]- left ear  GERD (Gastroesophageal Reflux Disease)  AAA (Abdominal Aortic Aneurysm)  Cataract, bilateral  Arthritis of Knee  Hx of Bladder Cancer: Late 1990&#x27;s  Hyperlipidemia  Atrial Fibrillation  HTN - Hypertension  Emphysema  S/P breast lumpectomy  S/P hysterectomy  S/P cataract surgery: 2012 - bilateral eyes  S/P AAA repair: 11/2011  History of Dilatation and Curettage x3: 38 years ago  Transurethral resection of bladder tumor x2: Late 1990&#x27;s - x 2 TURBT  History of Total Hysterectomy with Removal of Both Tubes and Ovaries: 40 years ago  History of Tonsillectomy: 60 years ago        Medication list         MEDICATIONS  (STANDING):  ALBUTerol/ipratropium for Nebulization 3 milliLiter(s) Nebulizer every 6 hours  benzonatate 100 milliGRAM(s) Oral three times a day  buDESOnide   0.5 milliGRAM(s) Respule 0.5 milliGRAM(s) Inhalation two times a day  guaiFENesin  milliGRAM(s) Oral every 12 hours  methylPREDNISolone sodium succinate Injectable 40 milliGRAM(s) IV Push every 8 hours  metoprolol succinate ER 25 milliGRAM(s) Oral daily  pantoprazole    Tablet 40 milliGRAM(s) Oral before breakfast  simvastatin 40 milliGRAM(s) Oral at bedtime  tiotropium 18 MICROgram(s) Capsule 1 Capsule(s) Inhalation daily    MEDICATIONS  (PRN):  acetaminophen   Tablet 650 milliGRAM(s) Oral every 6 hours PRN For Temp greater than 38 C (100.4 F)  acetaminophen   Tablet. 650 milliGRAM(s) Oral every 6 hours PRN Mild Pain (1 - 3)  guaiFENesin    Syrup 100 milliGRAM(s) Oral every 6 hours PRN Cough         Vitals log        ICU Vital Signs Last 24 Hrs  T(C): 37.2 (09 Jan 2018 21:10), Max: 37.2 (09 Jan 2018 21:10)  T(F): 99 (09 Jan 2018 21:10), Max: 99 (09 Jan 2018 21:10)  HR: 73 (10 Pieter 2018 06:33) (73 - 84)  BP: 117/47 (10 Pieter 2018 06:33) (117/47 - 137/69)  BP(mean): --  ABP: --  ABP(mean): --  RR: 20 (10 Pieter 2018 06:33) (20 - 24)  SpO2: 100% (10 Pieter 2018 06:33) (99% - 100%)           Input and Output:  I&O's Detail      Lab Data                        12.3   6.7   )-----------( 168      ( 09 Jan 2018 22:39 )             37.2     01-09    141  |  96  |  110<H>  ----------------------------<  120<H>  3.9   |  32<H>  |  3.00<H>    Ca    8.8      09 Jan 2018 22:39    TPro  7.0  /  Alb  3.4  /  TBili  0.7  /  DBili  x   /  AST  20  /  ALT  21  /  AlkPhos  60  01-09        ex smoker  non drinker  home o2 dep COPD  lives with children  lost a son in Nov due to pancreatic ca      Review of Systems	  frail  weak  anxious      Objective     Physical Examination  extr edema  head at  heart s1s2  lungs dc BS and crackles bilaterally  cn grossly int  moves all extr  verbal  on o2 NC        Pertinent Lab findings & Imaging      [place]:  NO   Adequate UO     I&O's Detail           Discussed with:     Cultures:	        Radiology      EXAM:  XR CHEST AP OR PA 1V                            PROCEDURE DATE:  01/09/2018          INTERPRETATION:  Chest portable.    Clinical History: Cough, upper respiratory infection.    Comparison: 4/13/2017.    Single AP view submitted.  The patient is rotated.  The left lung apex is partially secured by the patient's chin.    The evaluation of the cardiomediastinal silhouette is limited on portable   technique.  There is arteriosclerotic calcification of the aorta.    Again noted are prominent bronchovascular markings at the lower lung   zones bilaterally.  There is subsegmental atelectasis and/or fibrosis right lung base.  Blunting at left costophrenic angle is again noted either representing   chronic pleural thickening and/or trace pleural effusion.    No new lobar lung consolidation is noted.    Impression:    Findings as discussed above.                      JANIA TAFOYA M.D., ATTENDING RADIOLOGIST  This document has been electronically signed. Pieter 10 2018  7:05AM

## 2018-01-10 NOTE — H&P ADULT - PROBLEM SELECTOR PLAN 3
FITZ on CKD  hold all nephrotoxic medications  hold diuretics  Serial labs  Renal consult  Hold IVF for now due to CHF

## 2018-01-11 DIAGNOSIS — I50.9 HEART FAILURE, UNSPECIFIED: ICD-10-CM

## 2018-01-11 LAB
ANION GAP SERPL CALC-SCNC: 10 MMOL/L — SIGNIFICANT CHANGE UP (ref 5–17)
BUN SERPL-MCNC: 109 MG/DL — HIGH (ref 7–23)
CALCIUM SERPL-MCNC: 9.2 MG/DL — SIGNIFICANT CHANGE UP (ref 8.5–10.1)
CHLORIDE SERPL-SCNC: 94 MMOL/L — LOW (ref 96–108)
CO2 SERPL-SCNC: 32 MMOL/L — HIGH (ref 22–31)
CREAT SERPL-MCNC: 3.1 MG/DL — HIGH (ref 0.5–1.3)
GLUCOSE SERPL-MCNC: 210 MG/DL — HIGH (ref 70–99)
POTASSIUM SERPL-MCNC: 4.2 MMOL/L — SIGNIFICANT CHANGE UP (ref 3.5–5.3)
POTASSIUM SERPL-SCNC: 4.2 MMOL/L — SIGNIFICANT CHANGE UP (ref 3.5–5.3)
SODIUM SERPL-SCNC: 136 MMOL/L — SIGNIFICANT CHANGE UP (ref 135–145)

## 2018-01-11 PROCEDURE — 99233 SBSQ HOSP IP/OBS HIGH 50: CPT

## 2018-01-11 PROCEDURE — 76770 US EXAM ABDO BACK WALL COMP: CPT | Mod: 26

## 2018-01-11 RX ORDER — NYSTATIN CREAM 100000 [USP'U]/G
1 CREAM TOPICAL
Qty: 0 | Refills: 0 | Status: DISCONTINUED | OUTPATIENT
Start: 2018-01-11 | End: 2018-01-14

## 2018-01-11 RX ADMIN — Medication 100 MILLIGRAM(S): at 15:39

## 2018-01-11 RX ADMIN — Medication 20 MILLIGRAM(S): at 15:39

## 2018-01-11 RX ADMIN — Medication 0.5 MILLIGRAM(S): at 20:14

## 2018-01-11 RX ADMIN — APIXABAN 2.5 MILLIGRAM(S): 2.5 TABLET, FILM COATED ORAL at 18:24

## 2018-01-11 RX ADMIN — BUMETANIDE 1 MILLIGRAM(S): 0.25 INJECTION INTRAMUSCULAR; INTRAVENOUS at 18:24

## 2018-01-11 RX ADMIN — Medication 3 MILLILITER(S): at 08:49

## 2018-01-11 RX ADMIN — Medication 3 MILLILITER(S): at 01:55

## 2018-01-11 RX ADMIN — NYSTATIN CREAM 1 APPLICATION(S): 100000 CREAM TOPICAL at 18:24

## 2018-01-11 RX ADMIN — NYSTATIN CREAM 1 APPLICATION(S): 100000 CREAM TOPICAL at 06:09

## 2018-01-11 RX ADMIN — Medication 3 MILLILITER(S): at 20:14

## 2018-01-11 RX ADMIN — PANTOPRAZOLE SODIUM 40 MILLIGRAM(S): 20 TABLET, DELAYED RELEASE ORAL at 05:35

## 2018-01-11 RX ADMIN — Medication 20 MILLIGRAM(S): at 21:38

## 2018-01-11 RX ADMIN — Medication 40 MILLIGRAM(S): at 05:34

## 2018-01-11 RX ADMIN — Medication 3 MILLILITER(S): at 13:48

## 2018-01-11 RX ADMIN — BUMETANIDE 1 MILLIGRAM(S): 0.25 INJECTION INTRAMUSCULAR; INTRAVENOUS at 05:34

## 2018-01-11 RX ADMIN — TIOTROPIUM BROMIDE 1 CAPSULE(S): 18 CAPSULE ORAL; RESPIRATORY (INHALATION) at 05:35

## 2018-01-11 RX ADMIN — Medication 25 MILLIGRAM(S): at 05:35

## 2018-01-11 RX ADMIN — Medication 600 MILLIGRAM(S): at 05:35

## 2018-01-11 RX ADMIN — SIMVASTATIN 40 MILLIGRAM(S): 20 TABLET, FILM COATED ORAL at 21:38

## 2018-01-11 RX ADMIN — Medication 100 MILLIGRAM(S): at 05:34

## 2018-01-11 RX ADMIN — APIXABAN 2.5 MILLIGRAM(S): 2.5 TABLET, FILM COATED ORAL at 05:34

## 2018-01-11 RX ADMIN — Medication 600 MILLIGRAM(S): at 18:24

## 2018-01-11 RX ADMIN — Medication 0.5 MILLIGRAM(S): at 08:49

## 2018-01-11 NOTE — PROGRESS NOTE ADULT - SUBJECTIVE AND OBJECTIVE BOX
Date/Time Patient Seen:  		  Referring MD:   Data Reviewed	       Patient is a 88y old  Female who presents with a chief complaint of SOB, sri. LE edema (10 Pieter 2018 16:27)  in bed  delirium noted  vs and meds reviewed  on IVF      Subjective/HPI     PAST MEDICAL & SURGICAL HISTORY:  Cancer: bladder  Hyperlipidemia  COPD, moderate: uses o2 at home  AAA (abdominal aortic aneurysm): incisional hernia  Hypertension  Anxiety  Otosclerosis, left: [place]- left ear  GERD (Gastroesophageal Reflux Disease)  AAA (Abdominal Aortic Aneurysm)  Cataract, bilateral  Arthritis of Knee  Hx of Bladder Cancer: Late 1990&#x27;s  Hyperlipidemia  Atrial Fibrillation  HTN - Hypertension  Emphysema  S/P breast lumpectomy  S/P hysterectomy  S/P cataract surgery: 2012 - bilateral eyes  S/P AAA repair: 11/2011  History of Dilatation and Curettage x3: 38 years ago  Transurethral resection of bladder tumor x2: Late 1990&#x27;s - x 2 TURBT  History of Total Hysterectomy with Removal of Both Tubes and Ovaries: 40 years ago  History of Tonsillectomy: 60 years ago        Medication list         MEDICATIONS  (STANDING):  ALBUTerol/ipratropium for Nebulization 3 milliLiter(s) Nebulizer every 6 hours  apixaban 2.5 milliGRAM(s) Oral every 12 hours  benzonatate 100 milliGRAM(s) Oral three times a day  buDESOnide   0.5 milliGRAM(s) Respule 0.5 milliGRAM(s) Inhalation two times a day  buMETAnide Injectable 1 milliGRAM(s) IV Push every 12 hours  guaiFENesin  milliGRAM(s) Oral every 12 hours  methylPREDNISolone sodium succinate Injectable 20 milliGRAM(s) IV Push three times a day  metoprolol succinate ER 25 milliGRAM(s) Oral daily  nystatin Powder 1 Application(s) Topical two times a day  pantoprazole    Tablet 40 milliGRAM(s) Oral before breakfast  simvastatin 40 milliGRAM(s) Oral at bedtime  sodium chloride 0.45%. 1000 milliLiter(s) (50 mL/Hr) IV Continuous <Continuous>  tiotropium 18 MICROgram(s) Capsule 1 Capsule(s) Inhalation daily    MEDICATIONS  (PRN):  acetaminophen   Tablet 650 milliGRAM(s) Oral every 6 hours PRN For Temp greater than 38 C (100.4 F)  acetaminophen   Tablet. 650 milliGRAM(s) Oral every 6 hours PRN Mild Pain (1 - 3)  guaiFENesin    Syrup 100 milliGRAM(s) Oral every 6 hours PRN Cough         Vitals log        ICU Vital Signs Last 24 Hrs  T(C): 36.3 (11 Jan 2018 05:05), Max: 36.9 (10 Pieter 2018 21:20)  T(F): 97.4 (11 Jan 2018 05:05), Max: 98.4 (10 Pieter 2018 21:20)  HR: 88 (11 Jan 2018 05:05) (71 - 97)  BP: 140/77 (11 Jan 2018 05:05) (117/47 - 157/85)  BP(mean): --  ABP: --  ABP(mean): --  RR: 17 (11 Jan 2018 05:05) (17 - 21)  SpO2: 96% (11 Jan 2018 05:05) (95% - 100%)           Input and Output:  I&O's Detail    10 Pieter 2018 07:01  -  11 Jan 2018 05:48  --------------------------------------------------------  IN:    sodium chloride 0.45%.: 450 mL  Total IN: 450 mL    OUT:  Total OUT: 0 mL    Total NET: 450 mL          Lab Data                        12.0   5.4   )-----------( 158      ( 10 Pieter 2018 08:28 )             36.2     01-10    140  |  95<L>  |  111<H>  ----------------------------<  225<H>  4.2   |  36<H>  |  3.00<H>    Ca    9.1      10 Pieter 2018 08:28  Mg     2.8     01-10    TPro  7.0  /  Alb  3.4  /  TBili  0.7  /  DBili  x   /  AST  20  /  ALT  21  /  AlkPhos  60  01-09            Review of Systems	      Objective     Physical Examination    frail  weak  head at  min confused  lungs dec BS  occ crackles      Pertinent Lab findings & Imaging      [place]:  NO   Adequate UO     I&O's Detail    10 Pieter 2018 07:01  -  11 Jan 2018 05:48  --------------------------------------------------------  IN:    sodium chloride 0.45%.: 450 mL  Total IN: 450 mL    OUT:  Total OUT: 0 mL    Total NET: 450 mL               Discussed with:     Cultures:	        Radiology

## 2018-01-11 NOTE — PROGRESS NOTE ADULT - SUBJECTIVE AND OBJECTIVE BOX
Patient is a 88y old  Female who presents with a chief complaint of SOB, sri. LE edema (10 Pieter 2018 16:27)      INTERVAL HPI/OVERNIGHT EVENTS: Patient seen and examined. NAD. No complaints.    Vital Signs Last 24 Hrs  T(C): 36.3 (11 Jan 2018 05:05), Max: 36.9 (10 Pieter 2018 21:20)  T(F): 97.4 (11 Jan 2018 05:05), Max: 98.4 (10 Pieter 2018 21:20)  HR: 62 (11 Jan 2018 08:49) (62 - 97)  BP: 140/77 (11 Jan 2018 05:05) (140/77 - 157/85)  BP(mean): --  RR: 17 (11 Jan 2018 05:05) (17 - 21)  SpO2: 97% (11 Jan 2018 08:49) (96% - 100%)    01-11    136  |  94<L>  |  109<H>  ----------------------------<  210<H>  4.2   |  32<H>  |  3.10<H>    Ca    9.2      11 Jan 2018 06:18  Mg     2.8     01-10    TPro  7.0  /  Alb  3.4  /  TBili  0.7  /  DBili  x   /  AST  20  /  ALT  21  /  AlkPhos  60  01-09                          12.0   5.4   )-----------( 158      ( 10 Pieter 2018 08:28 )             36.2     PT/INR - ( 09 Jan 2018 22:39 )   PT: 13.9 sec;   INR: 1.27 ratio         PTT - ( 09 Jan 2018 22:39 )  PTT:27.6 sec  CAPILLARY BLOOD GLUCOSE                  acetaminophen   Tablet 650 milliGRAM(s) Oral every 6 hours PRN  acetaminophen   Tablet. 650 milliGRAM(s) Oral every 6 hours PRN  ALBUTerol/ipratropium for Nebulization 3 milliLiter(s) Nebulizer every 6 hours  apixaban 2.5 milliGRAM(s) Oral every 12 hours  benzonatate 100 milliGRAM(s) Oral three times a day  buDESOnide   0.5 milliGRAM(s) Respule 0.5 milliGRAM(s) Inhalation two times a day  buMETAnide Injectable 1 milliGRAM(s) IV Push every 12 hours  guaiFENesin    Syrup 100 milliGRAM(s) Oral every 6 hours PRN  guaiFENesin  milliGRAM(s) Oral every 12 hours  methylPREDNISolone sodium succinate Injectable 20 milliGRAM(s) IV Push three times a day  metoprolol succinate ER 25 milliGRAM(s) Oral daily  nystatin Powder 1 Application(s) Topical two times a day  pantoprazole    Tablet 40 milliGRAM(s) Oral before breakfast  simvastatin 40 milliGRAM(s) Oral at bedtime  sodium chloride 0.45%. 1000 milliLiter(s) IV Continuous <Continuous>  tiotropium 18 MICROgram(s) Capsule 1 Capsule(s) Inhalation daily          acetaminophen   Tablet 650 milliGRAM(s) Oral every 6 hours PRN  acetaminophen   Tablet. 650 milliGRAM(s) Oral every 6 hours PRN  ALBUTerol/ipratropium for Nebulization 3 milliLiter(s) Nebulizer every 6 hours  apixaban 2.5 milliGRAM(s) Oral every 12 hours  benzonatate 100 milliGRAM(s) Oral three times a day  buDESOnide   0.5 milliGRAM(s) Respule 0.5 milliGRAM(s) Inhalation two times a day  buMETAnide Injectable 1 milliGRAM(s) IV Push every 12 hours  guaiFENesin    Syrup 100 milliGRAM(s) Oral every 6 hours PRN  guaiFENesin  milliGRAM(s) Oral every 12 hours  methylPREDNISolone sodium succinate Injectable 20 milliGRAM(s) IV Push three times a day  metoprolol succinate ER 25 milliGRAM(s) Oral daily  nystatin Powder 1 Application(s) Topical two times a day  pantoprazole    Tablet 40 milliGRAM(s) Oral before breakfast  simvastatin 40 milliGRAM(s) Oral at bedtime  sodium chloride 0.45%. 1000 milliLiter(s) IV Continuous <Continuous>  tiotropium 18 MICROgram(s) Capsule 1 Capsule(s) Inhalation daily      REVIEW OF SYSTEMS:  CONSTITUTIONAL: No fever, no weight loss, or no fatigue  NECK: No pain, no stiffness  RESPIRATORY: + cough, no wheezing, no chills, no hemoptysis, No shortness of breath  CARDIOVASCULAR: No chest pain, no palpitations, no dizziness, no leg swelling  GASTROINTESTINAL: No abdominal pain. No nausea, no vomiting, no hematemesis; No diarrhea, no constipation. No melena, no hematochezia.  GENITOURINARY: No dysuria, no frequency, no hematuria, no incontinence  NEUROLOGICAL: No headaches, no loss of strength, no numbness, no tremors  SKIN: No itching, no burning  MUSCULOSKELETAL: No joint pain, no swelling; No muscle, no back, no extremity pain  PSYCHIATRIC: No depression, no mood swings,   HEME/LYMPH: No easy bruising, no bleeding gums  ALLERY AND IMMUNOLOGIC: No hives       Consultant(s) Notes Reviewed:  [x ] YES  [ ] NO    PHYSICAL EXAM:  GENERAL: NAD  HEAD:  Atraumatic, Normocephalic  EYES: EOMI, PERRLA, conjunctiva and sclera clear  ENMT: No tonsillar erythema, exudates, or enlargement; Moist mucous membranes  NECK: Supple, No JVD  NERVOUS SYSTEM:  Awake & alert  CHEST/LUNG: Clear to auscultation bilaterally; No rales, rhonchi, wheezing,  HEART: Regular rate and rhythm  ABDOMEN: Soft, Nontender, Nondistended; Bowel sounds present  EXTREMITIES:  No clubbing, cyanosis, or edema  LYMPH: No lymphadenopathy noted  SKIN: No rashes      Advanced care planning discussed with patient/family [x ] YES   [ ] NO    Advanced care planning discussed with patient/family. Advanced care planning forms reviewed/discussed/completed. 20 minutes spent.

## 2018-01-11 NOTE — PROGRESS NOTE ADULT - SUBJECTIVE AND OBJECTIVE BOX
Follow up: Cough, SOB, CHF exacerbation. Resting in bed comfortably.     HPI:  89 yo F with PMH of HTN CHF COPD on home O2, CKD who was recently d/c'd from  rehab, p/w cough, congestion x past ~ 3 days. Pt seen by urgent care, on zithromax x past 2 days. Now with inc dyspnea, gen malaise. Pt had bad coughing fit PTA, reportedly was briefly cyanotic. No chest pain. no abd pain. No vomiting /diarrhea. No agg/allev factors. No recent trauma. No recent travel. Some recent inc LE edema, being followed by outpt md with recent increase in lasix. No other inj or co. Patient denies fevers/chills/cp. She reports that her daughter had bronchitis recently. She lives with her daughter. (10 Pieter 2018 10:56)      PAST MEDICAL & SURGICAL HISTORY:  Cancer: bladder  Hyperlipidemia  COPD, moderate: uses o2 at home  AAA (abdominal aortic aneurysm): incisional hernia  Hypertension  Anxiety  Otosclerosis, left: [place]- left ear  GERD (Gastroesophageal Reflux Disease)  AAA (Abdominal Aortic Aneurysm)  Arthritis of Knee  Hx of Bladder Cancer: Late 1990&#x27;s  Hyperlipidemia  Atrial Fibrillation  HTN - Hypertension  Emphysema  S/P breast lumpectomy  S/P hysterectomy  S/P cataract surgery: 2012 - bilateral eyes  S/P AAA repair: 11/2011  History of Dilatation and Curettage x3: 38 years ago  Transurethral resection of bladder tumor x2: Late 1990&#x27;s - x 2 TURBT  History of Total Hysterectomy with Removal of Both Tubes and Ovaries: 40 years ago  History of Tonsillectomy: 60 years ago      MEDICATIONS  (STANDING):  ALBUTerol/ipratropium for Nebulization 3 milliLiter(s) Nebulizer every 6 hours  apixaban 2.5 milliGRAM(s) Oral every 12 hours  benzonatate 100 milliGRAM(s) Oral three times a day  buDESOnide   0.5 milliGRAM(s) Respule 0.5 milliGRAM(s) Inhalation two times a day  buMETAnide Injectable 1 milliGRAM(s) IV Push every 12 hours  guaiFENesin  milliGRAM(s) Oral every 12 hours  methylPREDNISolone sodium succinate Injectable 20 milliGRAM(s) IV Push three times a day  metoprolol succinate ER 25 milliGRAM(s) Oral daily  nystatin Powder 1 Application(s) Topical two times a day  pantoprazole    Tablet 40 milliGRAM(s) Oral before breakfast  simvastatin 40 milliGRAM(s) Oral at bedtime  sodium chloride 0.45%. 1000 milliLiter(s) (50 mL/Hr) IV Continuous <Continuous>  tiotropium 18 MICROgram(s) Capsule 1 Capsule(s) Inhalation daily    MEDICATIONS  (PRN):  acetaminophen   Tablet 650 milliGRAM(s) Oral every 6 hours PRN For Temp greater than 38 C (100.4 F)  acetaminophen   Tablet. 650 milliGRAM(s) Oral every 6 hours PRN Mild Pain (1 - 3)  guaiFENesin    Syrup 100 milliGRAM(s) Oral every 6 hours PRN Cough      REVIEW OF SYSTEMS:    CONSTITUTIONAL: No weakness, fevers or chills  EYES: No visual changes, No diplopia  ENMT: No throat pain , No exudate  NECK: No pain or stiffness  RESPIRATORY: No cough, wheezing, hemoptysis; No shortness of breath  CARDIOVASCULAR: No chest pain or palpitations  GASTROINTESTINAL: No abdominal pain. No nausea, vomiting, or hematemesis; No diarrhea or constipation. No melena or hematochezia.  GENITOURINARY: No dysuria, frequency or hematuria  NEUROLOGICAL: No numbness or weakness  SKIN: No itching or rash  All other review of systems is negative unless indicated above    Vital Signs Last 24 Hrs  T(C): 36.3 (11 Jan 2018 05:05), Max: 36.9 (10 Pieter 2018 21:20)  T(F): 97.4 (11 Jan 2018 05:05), Max: 98.4 (10 Pieter 2018 21:20)  HR: 62 (11 Jan 2018 08:49) (62 - 97)  BP: 140/77 (11 Jan 2018 05:05) (140/77 - 157/85)  BP(mean): --  RR: 17 (11 Jan 2018 05:05) (17 - 21)  SpO2: 97% (11 Jan 2018 08:49) (96% - 100%)    I&O's Summary    10 Pieter 2018 07:01  -  11 Jan 2018 07:00  --------------------------------------------------------  IN: 500 mL / OUT: 0 mL / NET: 500 mL    11 Jan 2018 07:01  -  11 Jan 2018 13:09  --------------------------------------------------------  IN: 240 mL / OUT: 0 mL / NET: 240 mL        PHYSICAL EXAM:  tele: off  Constitutional: NAD, awake and alert, well-developed  Eyes:  EOMI,  Pupils round, no lesions  ENMT: no exudate or erythema  Pulmonary: diminished breath sounds b/l, No wheezing, crackles, rales or rhonchi  Cardiovascular: Irregular S1 and S2, no murmurs, rubs, gallops or clicks  Gastrointestinal: Bowel Sounds present, soft, nontender.   Lymph: b/l 1+ pitting edema in LE  Neurological: Alert, no focal deficits  Skin: No rashes. No changes of chronic venous stasis. No cyanosis.  Psych:  Mood & affect appropriate Confused.                       12.0   5.4   )-----------( 158      ( 10 Pieter 2018 08:28 )             36.2     CBC Full  -  ( 10 Pieter 2018 08:28 )  WBC Count : 5.4 K/uL  Hemoglobin : 12.0 g/dL  Hematocrit : 36.2 %  Platelet Count - Automated : 158 K/uL  Mean Cell Volume : 94.0 fl  Mean Cell Hemoglobin : 31.3 pg  Mean Cell Hemoglobin Concentration : 33.2 gm/dL  Auto Neutrophil # : x  Auto Lymphocyte # : x  Auto Monocyte # : x  Auto Eosinophil # : x  Auto Basophil # : x  Auto Neutrophil % : x  Auto Lymphocyte % : x  Auto Monocyte % : x  Auto Eosinophil % : x  Auto Basophil % : x    01-11    136  |  94<L>  |  109<H>  ----------------------------<  210<H>  4.2   |  32<H>  |  3.10<H>    Ca    9.2      11 Jan 2018 06:18  Mg     2.8     01-10    TPro  7.0  /  Alb  3.4  /  TBili  0.7  /  DBili  x   /  AST  20  /  ALT  21  /  AlkPhos  60  01-09  < from: 12 Lead ECG (04.14.17 @ 05:33) >  Ventricular Rate 99 BPM    Atrial Rate 207 BPM    QRS Duration 96 ms    Q-T Interval 372 ms    QTC Calculation(Bezet) 468 ms    R Axis 79 degrees    T Axis 256 degrees    Diagnosis Line ATRIAL FIBRILLATION, V-RATE    LVH WITH SECONDARY REPOLARIZATION ABNORMALITY    < end of copied text >  < from: Xray Chest 1 View AP/PA (01.09.18 @ 21:49) >  PROCEDURE DATE:  01/09/2018          INTERPRETATION:  Chest portable.    Clinical History: Cough, upper respiratory infection.    Comparison: 4/13/2017.    Single AP view submitted.  The patient is rotated.  The left lung apex is partially secured by the patient's chin.    The evaluation of the cardiomediastinal silhouette is limited on portable   technique.  There is arteriosclerotic calcification of the aorta.    Again noted are prominentbronchovascular markings at the lower lung   zones bilaterally.  There is subsegmental atelectasis and/or fibrosis right lung base.  Blunting at left costophrenic angle is again noted either representing   chronic pleural thickening and/or trace pleural effusion.    No new lobar lung consolidation is noted.    Impression:    Findings as discussed above.      < end of copied text >  < from: TTE Echo Doppler w/o Cont (01.10.18 @ 14:09) >  PROCEDURE DATE:  01/10/2018        INTERPRETATION:  INDICATION: aortic valve disease    Blood Pressure 129/49    Height 170     Weight 78       BSA 1.9    Dimensions:    LA 4.1       Normal Values: 2.0- 4.0 cm    Ao 3.4        Normal Values: 2.0 - 3.8 cm  SEPTUM 1.1       Normal Values: 0.6 - 1.2 cm  PWT 1.1       Normal Values: 0.6 - 1.1 cm  LVIDd 5.0         Normal Values: 3.0 - 5.6 cm  LVIDs 3.3         Normal Values: 1.8 - 4.0 cm    Derived Variables:  LVMI     g/m2  RWT      Fractional Short      Ejection Fraction 65    Doppler Peak v. AoV=   (m/sec)    OBSERVATIONS:    Mitral Valve: normal, mild to moderate MR.  Aortic Valve/Aorta: normal trileaflet aortic valve. Moderate AI  Tricuspid Valve: normal with mild to moderate TR.  Pulmonic Valve: normal  Left Atrium: Enlarged  Right Atrium: Enlarged  Left Ventricle: normal LV size and systolic function, estimated LVEF of   65%.  Right Ventricle: normal size and systolic function.  Pericardium/Pleura: no significant pleural effusion, no significant   pericardial effusion.  Pulmonary/RV Pressure: estimated PA systolic pressure of 44 mmHg assuming   an RA pressure of 10 mmHg.  LV Diastolic Function:     Normal left ventricular size and systolic function, estimated LVEF of   65%. Normal right ventricular size and systolic function. Biatrial   enlargement. The aortic root is normal in size. The mitral valve is   structurally normal, mild to moderate MR. The aortic valve is trileaflet   without stenosis. Moderate AI. Mild to moderate TR. Estimated PA systolic   pressure is 44 mm Hg, assuming an RA pressure of 10 mmHg. No significant   pericardial effusion.       < end of copied text >

## 2018-01-11 NOTE — PROGRESS NOTE ADULT - PROBLEM SELECTOR PLAN 2
iv solumedrol  hold iv abx for now -- no evidence of PNA  nebulizers  symbicort  oxygen  pulmonary consult

## 2018-01-11 NOTE — PROGRESS NOTE ADULT - ASSESSMENT
89 yo F with PMH of afib on eliquis, HTN CHF COPD on home O2, CKD who was recently d/c'd from  rehab, p/w cough, congestion. Admitted with hMPV pna, COPD exacerbation, FITZ, heart failure.    - Cough and congestion likely 2/2 to viral infection. Monitor I's and O's. 1/10 Spoke to IZAIAH Jay at [facility] who spoke to Dr. Sameer Dey at [facility] regarding treatment of patient's diastolic dysfunction. Stated to start bumetanide 2 mg IV daily and see how she diuresis.   - Will consider change to warfarin from eliquis for a fib due to low cr clearance but not as yet will d/w attending the appropriate time to do so.  - No clear evidence of acute ischemia  - No acute changes on EKG compared to previous  - Previous ECHO: April 2017, normal rt ventricle size and function, mod pulm htn, hyperdynamic left ventricle  -1/10/18 ECHO: EF 65%, Moderate MR Moderate AI, Biatrial Enlargement, Moderate TR.  - BP well controlled, continue home BP meds, monitor routine hemodynamics  - Monitor and replete lytes, keep K>4, Mg>2  - Other cardiovascular workup will depend on clinical course.  - All other workup per primary team  - Will follow 87 yo F with PMH of afib on eliquis, HTN CHF COPD on home O2, CKD who was recently d/c'd from  rehab, p/w cough, congestion. Admitted with hMPV pna, COPD exacerbation, FITZ, heart failure.    - Cough and congestion likely 2/2 to viral infection. Monitor I's and O's. 1/10 Spoke to IZAIAH Jay at [facility] who spoke to Dr. Sameer Dey at [facility] regarding treatment of patient's diastolic dysfunction.   - for now would continue bumex 1 bid, can consider increasing tomorrow pending her clinical course.   - Will consider change to warfarin from eliquis for a fib due to low cr clearance but for now seems reasonable to continue 2.5 bid .  - No clear evidence of acute ischemia  - No acute changes on EKG compared to previous  - Previous ECHO: April 2017, normal rt ventricle size and function, mod pulm htn, hyperdynamic left ventricle  -1/10/18 ECHO: EF 65%, Moderate MR Moderate AI, Biatrial Enlargement, Moderate TR.  - BP well controlled, continue home BP meds, monitor routine hemodynamics  - Monitor and replete lytes, keep K>4, Mg>2  - Other cardiovascular workup will depend on clinical course.  - All other workup per primary team  - Will follow

## 2018-01-11 NOTE — PROGRESS NOTE ADULT - ASSESSMENT
·	FITZ, CKD 3: Prerenal azotemia  ·	h/o CHF  ·	? Viral Pneumonia  ·	Hypertension  ·	Dyspnea: COPD > CHF    D/c IVF. No evidence of fluid overload on CT. Encourage PO intake as tolerated. Labs as ordered.   Renal sonogram pending. Avoid nephrotoxic meds as possible. Avoid ACEI, ARB and NSAIDS. Monitor input and output.   Monitor BP trend. Titrate BP meds as needed. Salt restriction. Will follow electrolytes and renal function trend.

## 2018-01-11 NOTE — PROGRESS NOTE ADULT - PROBLEM SELECTOR PLAN 2
multi organ failure  CKD  COPD  CHF  caution with IVF  delirium - will dec Steroids IV  COPD - nebs, inhaler therapy, will taper steroids, keep sat > 88 pct  out of bed  I and O  am labs pending  prognosis very poor  pall care eval for goals of care discussion documented, pt remains full code for now

## 2018-01-11 NOTE — PROGRESS NOTE ADULT - SUBJECTIVE AND OBJECTIVE BOX
Patient is a 88y old  Female who presents with a chief complaint of SOB, sri. LE edema (10 Pieter 2018 16:27)      Patient seen in follow up for FITZ, CKD. No new complaints. + Dyspnea on exertion.     PAST MEDICAL HISTORY:  Cancer  Hyperlipidemia  COPD, moderate  AAA (abdominal aortic aneurysm)  Hypertension  Anxiety  Otosclerosis, left  GERD (Gastroesophageal Reflux Disease)  AAA (Abdominal Aortic Aneurysm)  Cataract, bilateral  Arthritis of Knee  Hx of Bladder Cancer  Hyperlipidemia  Atrial Fibrillation  HTN - Hypertension  Emphysema    MEDICATIONS  (STANDING):  ALBUTerol/ipratropium for Nebulization 3 milliLiter(s) Nebulizer every 6 hours  apixaban 2.5 milliGRAM(s) Oral every 12 hours  benzonatate 100 milliGRAM(s) Oral three times a day  buDESOnide   0.5 milliGRAM(s) Respule 0.5 milliGRAM(s) Inhalation two times a day  buMETAnide Injectable 1 milliGRAM(s) IV Push every 12 hours  guaiFENesin  milliGRAM(s) Oral every 12 hours  methylPREDNISolone sodium succinate Injectable 20 milliGRAM(s) IV Push three times a day  metoprolol succinate ER 25 milliGRAM(s) Oral daily  nystatin Powder 1 Application(s) Topical two times a day  pantoprazole    Tablet 40 milliGRAM(s) Oral before breakfast  simvastatin 40 milliGRAM(s) Oral at bedtime  sodium chloride 0.45%. 1000 milliLiter(s) (50 mL/Hr) IV Continuous <Continuous>  tiotropium 18 MICROgram(s) Capsule 1 Capsule(s) Inhalation daily    MEDICATIONS  (PRN):  acetaminophen   Tablet 650 milliGRAM(s) Oral every 6 hours PRN For Temp greater than 38 C (100.4 F)  acetaminophen   Tablet. 650 milliGRAM(s) Oral every 6 hours PRN Mild Pain (1 - 3)  guaiFENesin    Syrup 100 milliGRAM(s) Oral every 6 hours PRN Cough    T(C): 36.3 (01-11-18 @ 05:05), Max: 36.9 (01-10-18 @ 21:20)  HR: 62 (01-11-18 @ 08:49) (62 - 97)  BP: 140/77 (01-11-18 @ 05:05) (117/47 - 157/85)  RR: 17 (01-11-18 @ 05:05) (17 - 24)  SpO2: 97% (01-11-18 @ 08:49) (95% - 100%)  Wt(kg): --  I&O's Detail    10 Pieter 2018 07:01  -  11 Jan 2018 07:00  --------------------------------------------------------  IN:    sodium chloride 0.45%.: 500 mL  Total IN: 500 mL    OUT:  Total OUT: 0 mL    Total NET: 500 mL      11 Jan 2018 07:01  -  11 Jan 2018 11:22  --------------------------------------------------------  IN:    Oral Fluid: 240 mL  Total IN: 240 mL    OUT:  Total OUT: 0 mL    Total NET: 240 mL          PHYSICAL EXAM:  General: dyspnea  Respiratory: b/l air entry  Cardiovascular: S1 S2  Gastrointestinal: soft  Extremities:  edema                          12.0   5.4   )-----------( 158      ( 10 Pieter 2018 08:28 )             36.2     01-11    136  |  94<L>  |  109<H>  ----------------------------<  210<H>  4.2   |  32<H>  |  3.10<H>    Ca    9.2      11 Jan 2018 06:18  Mg     2.8     01-10    TPro  7.0  /  Alb  3.4  /  TBili  0.7  /  DBili  x   /  AST  20  /  ALT  21  /  AlkPhos  60  01-09        LIVER FUNCTIONS - ( 09 Jan 2018 22:39 )  Alb: 3.4 g/dL / Pro: 7.0 g/dL / ALK PHOS: 60 U/L / ALT: 21 U/L / AST: 20 U/L / GGT: x               Sodium, Serum: 136 (01-11 @ 06:18)  Sodium, Serum: 140 (01-10 @ 08:28)  Sodium, Serum: 141 (01-09 @ 22:39)    Creatinine, Serum: 3.10 (01-11 @ 06:18)  Creatinine, Serum: 3.00 (01-10 @ 08:28)  Creatinine, Serum: 3.00 (01-09 @ 22:39)    Potassium, Serum: 4.2 (01-11 @ 06:18)  Potassium, Serum: 4.2 (01-10 @ 08:28)  Potassium, Serum: 3.9 (01-09 @ 22:39)    Hemoglobin: 12.0 (01-10 @ 08:28)  Hemoglobin: 12.3 (01-09 @ 22:39)    < from: CT Chest No Cont (01.10.18 @ 09:25) >  EXAM:  CT CHEST                            PROCEDURE DATE:  01/10/2018          INTERPRETATION:  CLINICAL INFORMATION:  Shortness of breath, evaluate for   chronic lung disease.    PROCEDURE:  Using multislice helical CT, 2.5 mm sections were obtained   from the thoracic inlet through the lung bases.  Multiplanar reformatted images.    COMPARISON: Chest 3/7/2015.    FINDINGS:      There is minimal scarring/fibrosis inferior left lingula and posterior   left lower lobes.  There is atelectasis and/or scarring at the posterior right lower lobe.    There is a 5 mm nodule peripherally right lung apex, stable in   appearance. There are scattered tiny subpleural nodular opacities   bilaterally, stable in appearance.    The central airways remain patent.    No pleural effusion is noted.    There are shotty prevascular, pretracheal mediastinal lymph nodes,   measuring up to 10 mm; stable in appearance.  No enlarged hilar lymphadenopathy is noted.    There is arteriosclerotic calcification of thethoracic aorta.  Coronary artery calcifications are present.     There is an indeterminate subcentimeter hypodense lesion anteriorly right   hepatic lobe.   There is a stable appearance of the 2 cm low-density left adrenal nodule.  There is a small exophytic cyst upper pole right kidney.  There is a subcentimeter, indeterminate hypodense lesion medially at the   upper pole of the right kidney.    Impression:    Stable pulmonary nodule right upper lobe.    Stable mediastinal lymphadenopathy.    Noacute pulmonary process demonstrated.    Other findings as discussed.    < end of copied text >

## 2018-01-12 DIAGNOSIS — R41.0 DISORIENTATION, UNSPECIFIED: ICD-10-CM

## 2018-01-12 LAB
ANION GAP SERPL CALC-SCNC: 12 MMOL/L — SIGNIFICANT CHANGE UP (ref 5–17)
BUN SERPL-MCNC: 116 MG/DL — HIGH (ref 7–23)
CALCIUM SERPL-MCNC: 8.9 MG/DL — SIGNIFICANT CHANGE UP (ref 8.5–10.1)
CHLORIDE SERPL-SCNC: 92 MMOL/L — LOW (ref 96–108)
CO2 SERPL-SCNC: 29 MMOL/L — SIGNIFICANT CHANGE UP (ref 22–31)
CREAT SERPL-MCNC: 3.3 MG/DL — HIGH (ref 0.5–1.3)
GLUCOSE SERPL-MCNC: 291 MG/DL — HIGH (ref 70–99)
POTASSIUM SERPL-MCNC: 4 MMOL/L — SIGNIFICANT CHANGE UP (ref 3.5–5.3)
POTASSIUM SERPL-SCNC: 4 MMOL/L — SIGNIFICANT CHANGE UP (ref 3.5–5.3)
SODIUM SERPL-SCNC: 133 MMOL/L — LOW (ref 135–145)

## 2018-01-12 PROCEDURE — 99233 SBSQ HOSP IP/OBS HIGH 50: CPT

## 2018-01-12 RX ORDER — FLUTICASONE PROPIONATE 50 MCG
2 SPRAY, SUSPENSION NASAL DAILY
Qty: 0 | Refills: 0 | Status: DISCONTINUED | OUTPATIENT
Start: 2018-01-12 | End: 2018-01-14

## 2018-01-12 RX ORDER — OLANZAPINE 15 MG/1
5 TABLET, FILM COATED ORAL EVERY 6 HOURS
Qty: 0 | Refills: 0 | Status: DISCONTINUED | OUTPATIENT
Start: 2018-01-12 | End: 2018-01-14

## 2018-01-12 RX ORDER — ALPRAZOLAM 0.25 MG
0.25 TABLET ORAL ONCE
Qty: 0 | Refills: 0 | Status: DISCONTINUED | OUTPATIENT
Start: 2018-01-12 | End: 2018-01-12

## 2018-01-12 RX ORDER — QUETIAPINE FUMARATE 200 MG/1
12.5 TABLET, FILM COATED ORAL AT BEDTIME
Qty: 0 | Refills: 0 | Status: DISCONTINUED | OUTPATIENT
Start: 2018-01-12 | End: 2018-01-14

## 2018-01-12 RX ADMIN — Medication 2 SPRAY(S): at 13:02

## 2018-01-12 RX ADMIN — NYSTATIN CREAM 1 APPLICATION(S): 100000 CREAM TOPICAL at 17:55

## 2018-01-12 RX ADMIN — Medication 20 MILLIGRAM(S): at 05:15

## 2018-01-12 RX ADMIN — Medication 600 MILLIGRAM(S): at 17:54

## 2018-01-12 RX ADMIN — BUMETANIDE 1 MILLIGRAM(S): 0.25 INJECTION INTRAMUSCULAR; INTRAVENOUS at 05:17

## 2018-01-12 RX ADMIN — Medication 3 MILLILITER(S): at 01:52

## 2018-01-12 RX ADMIN — Medication 100 MILLIGRAM(S): at 14:43

## 2018-01-12 RX ADMIN — Medication 0.5 MILLIGRAM(S): at 21:39

## 2018-01-12 RX ADMIN — Medication 3 MILLILITER(S): at 06:37

## 2018-01-12 RX ADMIN — BUMETANIDE 1 MILLIGRAM(S): 0.25 INJECTION INTRAMUSCULAR; INTRAVENOUS at 18:03

## 2018-01-12 RX ADMIN — Medication 3 MILLILITER(S): at 21:39

## 2018-01-12 RX ADMIN — Medication 100 MILLIGRAM(S): at 21:18

## 2018-01-12 RX ADMIN — Medication 0.5 MILLIGRAM(S): at 06:38

## 2018-01-12 RX ADMIN — APIXABAN 2.5 MILLIGRAM(S): 2.5 TABLET, FILM COATED ORAL at 08:27

## 2018-01-12 RX ADMIN — Medication 20 MILLIGRAM(S): at 17:52

## 2018-01-12 RX ADMIN — APIXABAN 2.5 MILLIGRAM(S): 2.5 TABLET, FILM COATED ORAL at 19:58

## 2018-01-12 RX ADMIN — Medication 3 MILLILITER(S): at 17:02

## 2018-01-12 RX ADMIN — QUETIAPINE FUMARATE 12.5 MILLIGRAM(S): 200 TABLET, FILM COATED ORAL at 21:18

## 2018-01-12 RX ADMIN — SIMVASTATIN 40 MILLIGRAM(S): 20 TABLET, FILM COATED ORAL at 21:19

## 2018-01-12 NOTE — PROGRESS NOTE ADULT - SUBJECTIVE AND OBJECTIVE BOX
Patient is a 88y old  Female who presents with a chief complaint of SOB, sri. LE edema (10 Pieter 2018 16:27)      INTERVAL HPI/OVERNIGHT EVENTS: Patient seen and examined. NAD. No complaints. Confused.    Vital Signs Last 24 Hrs  T(C): 36.4 (12 Jan 2018 05:00), Max: 36.6 (11 Jan 2018 14:13)  T(F): 97.6 (12 Jan 2018 05:00), Max: 97.9 (11 Jan 2018 14:13)  HR: 104 (12 Jan 2018 06:57) (68 - 106)  BP: 153/73 (12 Jan 2018 05:00) (123/74 - 153/73)  BP(mean): --  RR: 17 (12 Jan 2018 05:00) (17 - 18)  SpO2: 94% (12 Jan 2018 06:57) (94% - 99%)    01-12    x   |  x   |  116<H>  ----------------------------<  291<H>  x    |  29  |  3.30<H>    Ca    8.9      12 Jan 2018 10:00          CAPILLARY BLOOD GLUCOSE                  acetaminophen   Tablet 650 milliGRAM(s) Oral every 6 hours PRN  acetaminophen   Tablet. 650 milliGRAM(s) Oral every 6 hours PRN  ALBUTerol/ipratropium for Nebulization 3 milliLiter(s) Nebulizer every 6 hours  apixaban 2.5 milliGRAM(s) Oral every 12 hours  benzonatate 100 milliGRAM(s) Oral three times a day  buDESOnide   0.5 milliGRAM(s) Respule 0.5 milliGRAM(s) Inhalation two times a day  buMETAnide Injectable 1 milliGRAM(s) IV Push every 12 hours  guaiFENesin    Syrup 100 milliGRAM(s) Oral every 6 hours PRN  guaiFENesin  milliGRAM(s) Oral every 12 hours  methylPREDNISolone sodium succinate Injectable 20 milliGRAM(s) IV Push two times a day  metoprolol succinate ER 25 milliGRAM(s) Oral daily  nystatin Powder 1 Application(s) Topical two times a day  pantoprazole    Tablet 40 milliGRAM(s) Oral before breakfast  simvastatin 40 milliGRAM(s) Oral at bedtime  sodium chloride 0.45%. 1000 milliLiter(s) IV Continuous <Continuous>  tiotropium 18 MICROgram(s) Capsule 1 Capsule(s) Inhalation daily              REVIEW OF SYSTEMS:  CONSTITUTIONAL: No fever, no weight loss, or no fatigue  NECK: No pain, no stiffness  RESPIRATORY: + cough, no wheezing, no chills, no hemoptysis, No shortness of breath  CARDIOVASCULAR: No chest pain, no palpitations, no dizziness, no leg swelling  GASTROINTESTINAL: No abdominal pain. No nausea, no vomiting, no hematemesis; No diarrhea, no constipation. No melena, no hematochezia.  GENITOURINARY: No dysuria, no frequency, no hematuria, no incontinence  NEUROLOGICAL: No headaches, no loss of strength, no numbness, no tremors  SKIN: No itching, no burning  MUSCULOSKELETAL: No joint pain, no swelling; No muscle, no back, no extremity pain  PSYCHIATRIC: No depression, no mood swings,   HEME/LYMPH: No easy bruising, no bleeding gums  ALLERY AND IMMUNOLOGIC: No hives       Consultant(s) Notes Reviewed:  [x ] YES  [ ] NO    PHYSICAL EXAM:  GENERAL: NAD  HEAD:  Atraumatic, Normocephalic  EYES: EOMI, PERRLA, conjunctiva and sclera clear  ENMT: No tonsillar erythema, exudates, or enlargement; Moist mucous membranes  NECK: Supple, No JVD  NERVOUS SYSTEM:  Awake & alert  CHEST/LUNG: Clear to auscultation bilaterally; No rales, rhonchi, wheezing,  HEART: Regular rate and rhythm  ABDOMEN: Soft, Nontender, Nondistended; Bowel sounds present  EXTREMITIES:  No clubbing, cyanosis, or edema  LYMPH: No lymphadenopathy noted  SKIN: No rashes      Advanced care planning discussed with patient/family [x ] YES   [ ] NO    Advanced care planning discussed with patient/family. Advanced care planning forms reviewed/discussed/completed. 20 minutes spent.

## 2018-01-12 NOTE — PROGRESS NOTE ADULT - SUBJECTIVE AND OBJECTIVE BOX
Patient is a 88y old  Female who presents with a chief complaint of SOB, sri. LE edema (10 Pieter 2018 16:27)      Patient seen in follow up for FITZ, CKD. Pt confused.  Labs pending.     PAST MEDICAL HISTORY:  Cancer  Hyperlipidemia  COPD, moderate  AAA (abdominal aortic aneurysm)  Hypertension  Anxiety  Otosclerosis, left  GERD (Gastroesophageal Reflux Disease)  AAA (Abdominal Aortic Aneurysm)  Cataract, bilateral  Arthritis of Knee  Hx of Bladder Cancer  Hyperlipidemia  Atrial Fibrillation  HTN - Hypertension  Emphysema    MEDICATIONS  (STANDING):  ALBUTerol/ipratropium for Nebulization 3 milliLiter(s) Nebulizer every 6 hours  apixaban 2.5 milliGRAM(s) Oral every 12 hours  benzonatate 100 milliGRAM(s) Oral three times a day  buDESOnide   0.5 milliGRAM(s) Respule 0.5 milliGRAM(s) Inhalation two times a day  buMETAnide Injectable 1 milliGRAM(s) IV Push every 12 hours  guaiFENesin  milliGRAM(s) Oral every 12 hours  methylPREDNISolone sodium succinate Injectable 20 milliGRAM(s) IV Push two times a day  metoprolol succinate ER 25 milliGRAM(s) Oral daily  nystatin Powder 1 Application(s) Topical two times a day  pantoprazole    Tablet 40 milliGRAM(s) Oral before breakfast  simvastatin 40 milliGRAM(s) Oral at bedtime  sodium chloride 0.45%. 1000 milliLiter(s) (50 mL/Hr) IV Continuous <Continuous>  tiotropium 18 MICROgram(s) Capsule 1 Capsule(s) Inhalation daily    MEDICATIONS  (PRN):  acetaminophen   Tablet 650 milliGRAM(s) Oral every 6 hours PRN For Temp greater than 38 C (100.4 F)  acetaminophen   Tablet. 650 milliGRAM(s) Oral every 6 hours PRN Mild Pain (1 - 3)  guaiFENesin    Syrup 100 milliGRAM(s) Oral every 6 hours PRN Cough    T(C): 36.4 (01-12-18 @ 05:00), Max: 36.9 (01-10-18 @ 21:20)  HR: 104 (01-12-18 @ 06:57) (62 - 106)  BP: 153/73 (01-12-18 @ 05:00) (123/74 - 157/85)  RR: 17 (01-12-18 @ 05:00)  SpO2: 94% (01-12-18 @ 06:57)  Wt(kg): --  I&O's Detail    11 Jan 2018 07:01  -  12 Jan 2018 07:00  --------------------------------------------------------  IN:    Oral Fluid: 240 mL  Total IN: 240 mL    OUT:  Total OUT: 0 mL    Total NET: 240 mL      12 Jan 2018 07:01  -  12 Jan 2018 09:52  --------------------------------------------------------  IN:    Oral Fluid: 240 mL  Total IN: 240 mL    OUT:    Voided: 200 mL  Total OUT: 200 mL    Total NET: 40 mL            PHYSICAL EXAM:  General: dyspnea  Respiratory: b/l air entry  Cardiovascular: S1 S2  Gastrointestinal: soft  Extremities:  edema                    LABORATORY:    01-11    136  |  94<L>  |  109<H>  ----------------------------<  210<H>  4.2   |  32<H>  |  3.10<H>    Ca    9.2      11 Jan 2018 06:18      Sodium, Serum: 136 mmol/L (01-11 @ 06:18)    Potassium, Serum: 4.2 mmol/L (01-11 @ 06:18)    Hemoglobin: 12.0 g/dL (01-10 @ 08:28)  Hemoglobin: 12.3 g/dL (01-09 @ 22:39)    Creatinine, Serum 3.10 (01-11 @ 06:18)  Creatinine, Serum 3.00 (01-10 @ 08:28)  Creatinine, Serum 3.00 (01-09 @ 22:39)      < from: US Kidney and Bladder (01.11.18 @ 10:45) >  EXAM:  US KIDNEYS AND BLADDER                            PROCEDURE DATE:  01/11/2018          INTERPRETATION:  CLINICAL INFORMATION: Acute kidney injury upon chronic   kidney disease.    COMPARISON: CT scan of the chest 1/10/2018.    TECHNIQUE: Sonography of the kidneys and bladder.     FINDINGS:    Exam is limited by patient's body habitus and difficulty positioning   patient.    Right kidney:  9 cm.   No hydronephrosis is noted.  Renal cortical thinning is noted. There is a cortical-based cyst   measuring 1.5 cm at the right upper pole. There is a 1.1 cm cyst medially   at the right upper pole.    Left kidney:  11 cm.   There is a 1.5 cm cyst at the midpole.  There is increased echogenicity of the left renal parenchyma, which may   reflect medical renal disease. There appears to be pelvicalyceal   fullness, cannot exclude hydronephrosis.  No shadowing intrarenal calculus is noted.    Urinary bladder: Within normal limits.  The prevoid bladder volume measures approximately 371 cc.  Thepatient is not able to void for this exam.    IMPRESSION:     Limited evaluation, particularly of the left kidney.    Possible mild left-sided hydronephrosis.    Bilateral renal cysts.    Echogenic left kidney may be associated with renal parenchymaldisease.    < end of copied text > Patient is a 88y old  Female who presents with a chief complaint of SOB, sri. LE edema (10 Pieter 2018 16:27)      Patient seen in follow up for FITZ, CKD. Pt confused. Worsening creatinine trend. On bumex.     PAST MEDICAL HISTORY:  Cancer  Hyperlipidemia  COPD, moderate  AAA (abdominal aortic aneurysm)  Hypertension  Anxiety  Otosclerosis, left  GERD (Gastroesophageal Reflux Disease)  AAA (Abdominal Aortic Aneurysm)  Cataract, bilateral  Arthritis of Knee  Hx of Bladder Cancer  Hyperlipidemia  Atrial Fibrillation  HTN - Hypertension  Emphysema    MEDICATIONS  (STANDING):  ALBUTerol/ipratropium for Nebulization 3 milliLiter(s) Nebulizer every 6 hours  apixaban 2.5 milliGRAM(s) Oral every 12 hours  benzonatate 100 milliGRAM(s) Oral three times a day  buDESOnide   0.5 milliGRAM(s) Respule 0.5 milliGRAM(s) Inhalation two times a day  buMETAnide Injectable 1 milliGRAM(s) IV Push every 12 hours  guaiFENesin  milliGRAM(s) Oral every 12 hours  methylPREDNISolone sodium succinate Injectable 20 milliGRAM(s) IV Push two times a day  metoprolol succinate ER 25 milliGRAM(s) Oral daily  nystatin Powder 1 Application(s) Topical two times a day  pantoprazole    Tablet 40 milliGRAM(s) Oral before breakfast  simvastatin 40 milliGRAM(s) Oral at bedtime  sodium chloride 0.45%. 1000 milliLiter(s) (50 mL/Hr) IV Continuous <Continuous>  tiotropium 18 MICROgram(s) Capsule 1 Capsule(s) Inhalation daily    MEDICATIONS  (PRN):  acetaminophen   Tablet 650 milliGRAM(s) Oral every 6 hours PRN For Temp greater than 38 C (100.4 F)  acetaminophen   Tablet. 650 milliGRAM(s) Oral every 6 hours PRN Mild Pain (1 - 3)  guaiFENesin    Syrup 100 milliGRAM(s) Oral every 6 hours PRN Cough    T(C): 36.4 (01-12-18 @ 05:00), Max: 36.9 (01-10-18 @ 21:20)  HR: 104 (01-12-18 @ 06:57) (62 - 106)  BP: 153/73 (01-12-18 @ 05:00) (123/74 - 157/85)  RR: 17 (01-12-18 @ 05:00)  SpO2: 94% (01-12-18 @ 06:57)  Wt(kg): --  I&O's Detail    11 Jan 2018 07:01  -  12 Jan 2018 07:00  --------------------------------------------------------  IN:    Oral Fluid: 240 mL  Total IN: 240 mL    OUT:  Total OUT: 0 mL    Total NET: 240 mL      12 Jan 2018 07:01  -  12 Jan 2018 09:52  --------------------------------------------------------  IN:    Oral Fluid: 240 mL  Total IN: 240 mL    OUT:    Voided: 200 mL  Total OUT: 200 mL    Total NET: 40 mL            PHYSICAL EXAM:  General: dyspnea  Respiratory: b/l air entry  Cardiovascular: S1 S2  Gastrointestinal: soft  Extremities:  edema                      LABORATORY:    01-12    133<L>  |  92<L>  |  116<H>  ----------------------------<  291<H>  4.0   |  29  |  3.30<H>    Ca    8.9      12 Jan 2018 10:00      Sodium, Serum: 133 mmol/L (01-12 @ 10:00)  Sodium, Serum: 136 mmol/L (01-11 @ 06:18)    Potassium, Serum: 4.0 mmol/L (01-12 @ 10:00)  Potassium, Serum: 4.2 mmol/L (01-11 @ 06:18)    Hemoglobin: 12.0 g/dL (01-10 @ 08:28)  Hemoglobin: 12.3 g/dL (01-09 @ 22:39)    Creatinine, Serum 3.30 (01-12 @ 10:00)  Creatinine, Serum 3.10 (01-11 @ 06:18)  Creatinine, Serum 3.00 (01-10 @ 08:28)  Creatinine, Serum 3.00 (01-09 @ 22:39)                  < from:  Kidney and Bladder (01.11.18 @ 10:45) >  EXAM:  US KIDNEYS AND BLADDER                            PROCEDURE DATE:  01/11/2018          INTERPRETATION:  CLINICAL INFORMATION: Acute kidney injury upon chronic   kidney disease.    COMPARISON: CT scan of the chest 1/10/2018.    TECHNIQUE: Sonography of the kidneys and bladder.     FINDINGS:    Exam is limited by patient's body habitus and difficulty positioning   patient.    Right kidney:  9 cm.   No hydronephrosis is noted.  Renal cortical thinning is noted. There is a cortical-based cyst   measuring 1.5 cm at the right upper pole. There is a 1.1 cm cyst medially   at the right upper pole.    Left kidney:  11 cm.   There is a 1.5 cm cyst at the midpole.  There is increased echogenicity of the left renal parenchyma, which may   reflect medical renal disease. There appears to be pelvicalyceal   fullness, cannot exclude hydronephrosis.  No shadowing intrarenal calculus is noted.    Urinary bladder: Within normal limits.  The prevoid bladder volume measures approximately 371 cc.  Thepatient is not able to void for this exam.    IMPRESSION:     Limited evaluation, particularly of the left kidney.    Possible mild left-sided hydronephrosis.    Bilateral renal cysts.    Echogenic left kidney may be associated with renal parenchymaldisease.    < end of copied text >

## 2018-01-12 NOTE — BEHAVIORAL HEALTH ASSESSMENT NOTE - HPI (INCLUDE ILLNESS QUALITY, SEVERITY, DURATION, TIMING, CONTEXT, MODIFYING FACTORS, ASSOCIATED SIGNS AND SYMPTOMS)
87 yo F with PMH of HTN CHF COPD on home O2, CKD who was recently d/c'd from  rehab, p/w cough, congestion x past ~ 3 days. Pt seen by urgent care, on zithromax x past 2 days. Now with inc dyspnea, gen malaise. Pt had bad coughing fit PTA, reportedly was briefly cyanotic. No chest pain. no abd pain. No vomiting /diarrhea. No agg/allev factors. No recent trauma. No recent travel. Some recent inc LE edema, being followed by outpt md with recent increase in lasix. No other inj or co. Patient denies fevers/chills/cp. She reports that her daughter had bronchitis recently. She lives with her daughter.   Patient has no reported psychiatric history, but she was agitated and confused initially, and psychiatry consult was called, At this time patient is lucid, denying symptoms of psychosis, denzel or depression.

## 2018-01-12 NOTE — PROGRESS NOTE ADULT - SUBJECTIVE AND OBJECTIVE BOX
HPI:  87 yo F with PMH of HTN CHF COPD on home O2, CKD who was recently d/c'd from  rehab, p/w cough, congestion x past ~ 3 days. Pt seen by urgent care, on zithromax x past 2 days. Now with inc dyspnea, gen malaise. Pt had bad coughing fit PTA, reportedly was briefly cyanotic. No chest pain. no abd pain. No vomiting /diarrhea. No agg/allev factors. No recent trauma. No recent travel. Some recent inc LE edema, being followed by outpt md with recent increase in lasix. No other inj or co. Patient denies fevers/chills/cp. She reports that her daughter had bronchitis recently. She lives with her daughter. (10 Pieter 2018 10:56)      SUBJECTIVE:  Patient is a 88y old  Female who presents with a chief complaint of SOB, sri. LE edema (10 Pieter 2018 16:27)          OBJECTIVE:  Review Of Systems:  Constitutional: [ ] Fever [ ] Chills [ ] Fatigue [ ] Weight change   HEENT: [ ] Blurred vision [ ] Eye Pain [ ] Headache [ ] Runny nose [ ] Sore Throat   Respiratory: [ ] Cough [ ] Wheezing [ x] Shortness of breath  Cardiovascular: [ ] Chest Pain [ ] Palpitations [ ] HERRING [ ] PND [ ] Orthopnea  Gastrointestinal: [ ] Abdominal Pain [ ] Diarrhea [ ] Constipation [ ] Hemorrhoids [ ] Nausea [ ] Vomiting  Genitourinary: [ ] Nocturia [ ] Dysuria [ ] Incontinence  Extremities: [ ] Swelling [ ] Joint Pain  Neurologic: [ ] Focal deficit [ ] Paresthesias [ ] Syncope [x] rambling [x] hyper [x] confused  Lymphatic: [ ] Swelling [ ] Lymphadenopathy   Skin: [ ] Rash [ ] Ecchymoses [ ] Wounds [ ] Lesions  Psychiatry: [ ] Depression [ ] Suicidal/Homicidal Ideation [ ] Anxiety [ ] Sleep Disturbances   [x ] 10 point review of systems is otherwise negative except as mentioned above            [ ]Unable to obtain    Allergy:  Allergies    adhesives (Rash)  latex (Rash)  penicillin (Rash)  penicillins (Urticaria; Rash)  shellfish (Rash)    Intolerances        Medications:  MEDICATIONS  (STANDING):  ALBUTerol/ipratropium for Nebulization 3 milliLiter(s) Nebulizer every 6 hours  apixaban 2.5 milliGRAM(s) Oral every 12 hours  benzonatate 100 milliGRAM(s) Oral three times a day  buDESOnide   0.5 milliGRAM(s) Respule 0.5 milliGRAM(s) Inhalation two times a day  buMETAnide Injectable 1 milliGRAM(s) IV Push every 12 hours  fluticasone propionate 50 MICROgram(s)/spray Nasal Spray 2 Spray(s) Both Nostrils daily  guaiFENesin  milliGRAM(s) Oral every 12 hours  methylPREDNISolone sodium succinate Injectable 20 milliGRAM(s) IV Push two times a day  metoprolol succinate ER 25 milliGRAM(s) Oral daily  nystatin Powder 1 Application(s) Topical two times a day  pantoprazole    Tablet 40 milliGRAM(s) Oral before breakfast  QUEtiapine 12.5 milliGRAM(s) Oral at bedtime  simvastatin 40 milliGRAM(s) Oral at bedtime  sodium chloride 0.45%. 1000 milliLiter(s) (50 mL/Hr) IV Continuous <Continuous>  tiotropium 18 MICROgram(s) Capsule 1 Capsule(s) Inhalation daily    MEDICATIONS  (PRN):  acetaminophen   Tablet 650 milliGRAM(s) Oral every 6 hours PRN For Temp greater than 38 C (100.4 F)  acetaminophen   Tablet. 650 milliGRAM(s) Oral every 6 hours PRN Mild Pain (1 - 3)  guaiFENesin    Syrup 100 milliGRAM(s) Oral every 6 hours PRN Cough      PMH/PSH/FH/SH: [ ] Unchanged    Vitals:  T(C): 36.8 (18 @ 14:19), Max: 36.8 (18 @ 14:19)  HR: 85 (18 @ 14:19) (85 - 106)  BP: 160/58 (18 @ 14:19) (127/77 - 160/58)  BP(mean): --  RR: 18 (18 @ 14:19) (17 - 18)  SpO2: 96% (18 @ 14:19) (94% - 99%)  Wt(kg): --  Daily     Daily Weight in k.2 (2018 05:00)  I&O's Summary    2018 07:01  -  2018 07:00  --------------------------------------------------------  IN: 240 mL / OUT: 0 mL / NET: 240 mL    2018 07:  -  2018 14:46  --------------------------------------------------------  IN: 240 mL / OUT: 200 mL / NET: 40 mL        Labs:        133<L>  |  92<L>  |  116<H>  ----------------------------<  291<H>  4.0   |  29  |  3.30<H>    Ca    8.9      2018 10:00                        ECG:  < from: 12 Lead ECG (18 @ 23:49) >  Ventricular Rate 87 BPM    Atrial Rate 326 BPM    QRS Duration 84 ms    Q-T Interval 330 ms    QTC Calculation(Bezet) 397 ms    R Axis 72 degrees    T Axis -64 degrees    Diagnosis Line Atrial flutter with variable AV block with premature ventricular or aberrantly conducted complexes  Septal infarct , age undetermined  ST & T wave abnormality, consider lateral ischemia  Abnormal ECG    Confirmed by Jorge Sharp ([id number]) on 1/10/2018 10:48:41 AM    < end of copied text >    Echo:  < from: TTE Echo Doppler w/o Cont (01.10.18 @ 14:09) >   EXAM:  ECHO TTE W/O CON COMP W/DOPPLR         PROCEDURE DATE:  01/10/2018        INTERPRETATION:  INDICATION: aortic valve disease    Blood Pressure 129/49    Height 170     Weight 78       BSA 1.9    Dimensions:    LA 4.1       Normal Values: 2.0- 4.0 cm    Ao 3.4        Normal Values: 2.0 - 3.8 cm  SEPTUM 1.1       Normal Values: 0.6 - 1.2 cm  PWT 1.1       Normal Values: 0.6 - 1.1 cm  LVIDd 5.0         Normal Values: 3.0 - 5.6 cm  LVIDs 3.3         Normal Values: 1.8 - 4.0 cm    Derived Variables:  LVMI     g/m2  RWT      Fractional Short      Ejection Fraction 65    Doppler Peak v. AoV=   (m/sec)    OBSERVATIONS:    Mitral Valve: normal, mild to moderate MR.  Aortic Valve/Aorta: normal trileaflet aortic valve. Moderate AI  Tricuspid Valve: normal with mild to moderate TR.  Pulmonic Valve: normal  Left Atrium: Enlarged  Right Atrium: Enlarged  Left Ventricle: normal LV size and systolic function, estimated LVEF of   65%.  Right Ventricle: normal size and systolic function.  Pericardium/Pleura: no significant pleural effusion, no significant   pericardial effusion.  Pulmonary/RV Pressure: estimated PA systolic pressure of 44 mmHg assuming   an RA pressure of 10 mmHg.  LV Diastolic Function:     Normal left ventricular size and systolic function, estimated LVEF of   65%. Normal right ventricular size and systolic function. Biatrial   enlargement. The aortic root is normal in size. The mitral valve is   structurally normal, mild to moderate MR. The aortic valve is trileaflet   without stenosis. Moderate AI. Mild to moderate TR. Estimated PA systolic   pressure is 44 mm Hg, assuming an RA pressure of 10 mmHg. No significant   pericardial effusion.                  MELBA DE LA CRUZ M.D., ATTENDING CARDIOLOGIST  This document has been electronically signed. 2018 10:38AM    < end of copied text >    Stress Testing:     Cath:    Imaging:  < from: US Kidney and Bladder (18 @ 10:45) >  EXAM:  US KIDNEYS AND BLADDER                            PROCEDURE DATE:  2018          INTERPRETATION:  CLINICAL INFORMATION: Acute kidney injury upon chronic   kidney disease.    COMPARISON: CT scan of the chest 1/10/2018.    TECHNIQUE: Sonography of the kidneys and bladder.     FINDINGS:    Exam is limited by patient's body habitus and difficulty positioning   patient.    Right kidney:  9 cm.   No hydronephrosis is noted.  Renal cortical thinning is noted. There is a cortical-based cyst   measuring 1.5 cm at the right upper pole. There is a 1.1 cm cyst medially   at the right upper pole.    Left kidney:  11 cm.   There is a 1.5 cm cyst at the midpole.  There is increased echogenicity of the left renal parenchyma, which may   reflect medical renal disease. There appears to be pelvicalyceal   fullness, cannot exclude hydronephrosis.  No shadowing intrarenal calculus is noted.    Urinary bladder: Within normal limits.  The prevoid bladder volume measures approximately 371 cc.  Thepatient is not able to void for this exam.    IMPRESSION:     Limited evaluation, particularly of the left kidney.    Possible mild left-sided hydronephrosis.    Bilateral renal cysts.    Echogenic left kidney may be associated with renal parenchymaldisease.                JANIA TAFOYA M.D., ATTENDING RADIOLOGIST  This document has been electronically signed. 2018  1:03PM    < end of copied text >  < from: US Duplex Venous Lower Ext Complete, Bilateral (01.10.18 @ 10:32) >  EXAM:  US DPLX LWR EXT VEINS COMPL BI                            PROCEDURE DATE:  01/10/2018          INTERPRETATION:  CLINICAL STATEMENT: Swelling leg.    TECHNIQUE: Ultrasound of bilateral lower extremity deep venous system.    COMPARISON: None.    FINDINGS:  There is color and spectral flow, compression and augmentation of the   common femoral, superficial femoral and popliteal veins.    There is flow in the posterior tibial vein.    Fluid collection noted in the left popliteal fossa region total   measurement of approximately 5.7 x 1.8 x 4.4 cm.    IMPRESSION:  No evidence of DVT.      Left Baker's cyst                MATTHEW BRANDON M.D., ATTENDING RADIOLOGIST  This document has been electronically signed. Pieter 10 2018 10:36AM              < end of copied text >    < from: CT Chest No Cont (01.10.18 @ 09:25) >  EXAM:  CT CHEST                            PROCEDURE DATE:  01/10/2018          INTERPRETATION:  CLINICAL INFORMATION:  Shortness of breath, evaluate for   chronic lung disease.    PROCEDURE:  Using multislice helical CT, 2.5 mm sections were obtained   from the thoracic inlet through the lung bases.  Multiplanar reformatted images.    COMPARISON: Chest 3/7/2015.    FINDINGS:      There is minimal scarring/fibrosis inferior left lingula and posterior   left lower lobes.  There is atelectasis and/or scarring at the posterior right lower lobe.    There is a 5 mm nodule peripherally right lung apex, stable in   appearance. There are scattered tiny subpleural nodular opacities   bilaterally, stable in appearance.    The central airways remain patent.    No pleural effusion is noted.    There are shotty prevascular, pretracheal mediastinal lymph nodes,   measuring up to 10 mm; stable in appearance.  No enlarged hilar lymphadenopathy is noted.    There is arteriosclerotic calcification of thethoracic aorta.  Coronary artery calcifications are present.     There is an indeterminate subcentimeter hypodense lesion anteriorly right   hepatic lobe.   There is a stable appearance of the 2 cm low-density left adrenal nodule.  There is a small exophytic cyst upper pole right kidney.  There is a subcentimeter, indeterminate hypodense lesion medially at the   upper pole of the right kidney.    Impression:    Stable pulmonary nodule right upper lobe.    Stable mediastinal lymphadenopathy.    Noacute pulmonary process demonstrated.    Other findings as discussed.                          JANIA TAFOYA M.D., ATTENDING RADIOLOGIST  This document has been electronically signed. Pieter 10 2018 10:36AM        < end of copied text >  Interpretation of Telemetry:  Not on Tele    Physical Exam:  Appearance: [ ] Normal  [ ] abnormal [x ] NAD but is dyspneic with activity [x] obese  Eyes: [x ] PERRL [ ] EOMI  HENT: [x ] Normal [ ] Abnormal oral mus[place]a [x ]NC/AT  Cardiovascular: [ ] S1 [ ] S2 [ ] RRR [ ] m/r/g [ ]edema [ ] JVP  Procedural Access Site: [ ]  hematoma [ ] tender to palpation [ ] 2+ pulse [ ] bruit [ ] Ecchymosis  Respiratory: [ ] Clear to auscultation bilaterally [x] Decreased bs in bases b/l   Gastrointestinal: [x ] Soft [ ] tenderness[ ] distension [x ] BS [x] Obese abdomen  Musculoskeletal: [ ] clubbing [ ] joint deformity   Neurologic: [x ] Non-focal  Lymphatic: [ ] lymphadenopathy [x] b/l LE edema +2 pitting  Psychiatry: [ ] AAOx3  [x ] confused [ ] disoriented [ ] Mood & affect appropriate [x] hyper rambling speech  Skin: [ ]  rashes [ ] ecchymoses [ ] cyanosis  x

## 2018-01-12 NOTE — PROGRESS NOTE ADULT - ASSESSMENT
89 yo F with PMH of afib on eliquis, HTN CHF COPD on home O2, CKD who was recently d/c'd from  rehab, p/w cough, congestion. Admitted with hMPV pna, COPD exacerbation, FITZ, heart failure.    - Cough and congestion likely 2/2 to viral infection. Monitor I's and O's. 1/10 Spoke to IZAIAH Jay at [facility] who spoke to Dr. Sameer Dey at [facility] regarding treatment of patient's diastolic dysfunction.   - for now would continue bumex 1 bid, can consider increasing tomorrow pending her clinical course.   - Will consider change to warfarin from eliquis for a fib due to low cr clearance but for now seems reasonable to continue 2.5 bid .  - No clear evidence of acute ischemia  - No acute changes on EKG compared to previous  - Previous ECHO: April 2017, normal rt ventricle size and function, mod pulm htn, hyperdynamic left ventricle  -1/10/18 ECHO: EF 65%, Moderate MR Moderate AI, Biatrial Enlargement, Moderate TR.  - BP well controlled, continue home BP meds, monitor routine hemodynamics  - Monitor and replete lytes, keep K>4, Mg>2  - Other cardiovascular workup will depend on clinical course.  - All other workup per primary team  - Will follow     Gabrielle Lewis NP-C  Cardiology 89 yo F with PMH of afib on eliquis, HTN CHF COPD on home O2, CKD who was recently d/c'd from  rehab, p/w cough, congestion. Admitted with hMPV pna, COPD exacerbation, FITZ, heart failure.    - Cough and congestion likely 2/2 to viral infection. Monitor I's and O's. 1/10 Spoke to IZAIAH Jay at [facility] who spoke to Dr. Sameer Dey at [facility] regarding treatment of patient's diastolic dysfunction.   - for now would continue bumex 1 bid and watch creatinine closely pt appears dyspneic with b/l LE edema dyspnea can be multifactorial but does appear to by volume overloaded.    - Monitor strict I&Os and daily weights.     - Will consider change to warfarin from eliquis for a fib due to low cr clearance but for now seems reasonable to continue 2.5 bid .  - Pt with FITZ on CKD Cr 3.3 renal following will monitor closely  - No clear evidence of acute ischemia  - No acute changes on EKG compared to previous  - Previous ECHO: April 2017, normal rt ventricle size and function, mod pulm htn, hyperdynamic left ventricle  -1/10/18 ECHO: EF 65%, Moderate MR Moderate AI, Biatrial Enlargement, Moderate TR.  - BP well controlled, continue home BP meds, monitor routine hemodynamics  - Monitor and replete lytes, keep K>4, Mg>2  - Other cardiovascular workup will depend on clinical course.  - All other workup per primary team  - Will follow     Gabrielle Lewis NP-C  Cardiology

## 2018-01-12 NOTE — PROGRESS NOTE ADULT - ASSESSMENT
·	FITZ, CKD 3: Prerenal azotemia  ·	h/o CHF  ·	? Viral Pneumonia  ·	Hypertension  ·	Dyspnea: COPD > CHF  ·	Confusion    Await repeat labs. To continue current meds. PO intake as tolerated. Labs as ordered. Sono results noted. Will get bladder scan.   Avoid nephrotoxic meds as possible. Avoid ACEI, ARB and NSAIDS. Monitor input and output.   Monitor BP trend. Titrate BP meds as needed. Salt restriction. Will follow electrolytes and renal function trend. ·	FITZ, CKD 3: Prerenal azotemia  ·	h/o CHF  ·	? Viral Pneumonia  ·	Hypertension  ·	Dyspnea: COPD > CHF  ·	Confusion    Worsening renal function with diuresis. Change to PO when k with cardiology. Doubt pt in significant fluid overlaod.   PO intake as tolerated. Labs as ordered. Sono results noted. Will get bladder scan.   Avoid nephrotoxic meds as possible. Avoid ACEI, ARB and NSAIDS. Monitor input and output.   Monitor BP trend. Titrate BP meds as needed. Salt restriction. Will follow electrolytes and renal function trend.

## 2018-01-12 NOTE — PROGRESS NOTE ADULT - SUBJECTIVE AND OBJECTIVE BOX
Date/Time Patient Seen:  		  Referring MD:   Data Reviewed	       Patient is a 88y old  Female who presents with a chief complaint of SOB, sri. 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  GERD (Gastroesophageal Reflux Disease)  AAA (Abdominal Aortic Aneurysm)  Cataract, bilateral  Arthritis of Knee  Hx of Bladder Cancer: Late 1990&#x27;s  Hyperlipidemia  Atrial Fibrillation  HTN - Hypertension  Emphysema  S/P breast lumpectomy  S/P hysterectomy  S/P cataract surgery: 2012 - bilateral eyes  S/P AAA repair: 11/2011  History of Dilatation and Curettage x3: 38 years ago  Transurethral resection of bladder tumor x2: Late 1990&#x27;s - x 2 TURBT  History of Total Hysterectomy with Removal of Both Tubes and Ovaries: 40 years ago  History of Tonsillectomy: 60 years ago        Medication list         MEDICATIONS  (STANDING):  ALBUTerol/ipratropium for Nebulization 3 milliLiter(s) Nebulizer every 6 hours  apixaban 2.5 milliGRAM(s) Oral every 12 hours  benzonatate 100 milliGRAM(s) Oral three times a day  buDESOnide   0.5 milliGRAM(s) Respule 0.5 milliGRAM(s) Inhalation two times a day  buMETAnide Injectable 1 milliGRAM(s) IV Push every 12 hours  guaiFENesin  milliGRAM(s) Oral every 12 hours  methylPREDNISolone sodium succinate Injectable 20 milliGRAM(s) IV Push two times a day  metoprolol succinate ER 25 milliGRAM(s) Oral daily  nystatin Powder 1 Application(s) Topical two times a day  pantoprazole    Tablet 40 milliGRAM(s) Oral before breakfast  simvastatin 40 milliGRAM(s) Oral at bedtime  sodium chloride 0.45%. 1000 milliLiter(s) (50 mL/Hr) IV Continuous <Continuous>  tiotropium 18 MICROgram(s) Capsule 1 Capsule(s) Inhalation daily    MEDICATIONS  (PRN):  acetaminophen   Tablet 650 milliGRAM(s) Oral every 6 hours PRN For Temp greater than 38 C (100.4 F)  acetaminophen   Tablet. 650 milliGRAM(s) Oral every 6 hours PRN Mild Pain (1 - 3)  guaiFENesin    Syrup 100 milliGRAM(s) Oral every 6 hours PRN Cough         Vitals log        ICU Vital Signs Last 24 Hrs  T(C): 36.4 (12 Jan 2018 05:00), Max: 36.6 (11 Jan 2018 14:13)  T(F): 97.6 (12 Jan 2018 05:00), Max: 97.9 (11 Jan 2018 14:13)  HR: 95 (12 Jan 2018 05:00) (62 - 97)  BP: 153/73 (12 Jan 2018 05:00) (123/74 - 153/73)  BP(mean): --  ABP: --  ABP(mean): --  RR: 17 (12 Jan 2018 05:00) (17 - 18)  SpO2: 96% (12 Jan 2018 05:00) (95% - 99%)           Input and Output:  I&O's Detail    10 Pieter 2018 07:01  -  11 Jan 2018 07:00  --------------------------------------------------------  IN:    sodium chloride 0.45%.: 500 mL  Total IN: 500 mL    OUT:  Total OUT: 0 mL    Total NET: 500 mL      11 Jan 2018 07:01  -  12 Jan 2018 05:43  --------------------------------------------------------  IN:    Oral Fluid: 240 mL  Total IN: 240 mL    OUT:  Total OUT: 0 mL    Total NET: 240 mL          Lab Data                        12.0   5.4   )-----------( 158      ( 10 Pieter 2018 08:28 )             36.2     01-11    136  |  94<L>  |  109<H>  ----------------------------<  210<H>  4.2   |  32<H>  |  3.10<H>    Ca    9.2      11 Jan 2018 06:18  Mg     2.8     01-10              Review of Systems	      Objective     Physical Examination  head at  heart s1s2  lungs dec BS  abd soft  cn grossly int  verbal        Pertinent Lab findings & Imaging      [place]:  NO   Adequate UO     I&O's Detail    10 Pieter 2018 07:01  -  11 Jan 2018 07:00  --------------------------------------------------------  IN:    sodium chloride 0.45%.: 500 mL  Total IN: 500 mL    OUT:  Total OUT: 0 mL    Total NET: 500 mL      11 Jan 2018 07:01  -  12 Jan 2018 05:43  --------------------------------------------------------  IN:    Oral Fluid: 240 mL  Total IN: 240 mL    OUT:  Total OUT: 0 mL    Total NET: 240 mL               Discussed with:     Cultures:	        Radiology

## 2018-01-13 VITALS
OXYGEN SATURATION: 99 % | SYSTOLIC BLOOD PRESSURE: 133 MMHG | RESPIRATION RATE: 18 BRPM | HEART RATE: 74 BPM | TEMPERATURE: 97 F | DIASTOLIC BLOOD PRESSURE: 60 MMHG

## 2018-01-13 DIAGNOSIS — G93.41 METABOLIC ENCEPHALOPATHY: ICD-10-CM

## 2018-01-13 LAB
ANION GAP SERPL CALC-SCNC: 10 MMOL/L — SIGNIFICANT CHANGE UP (ref 5–17)
BASE EXCESS BLDA CALC-SCNC: 4 MMOL/L — HIGH (ref -2–2)
BLOOD GAS COMMENTS ARTERIAL: SIGNIFICANT CHANGE UP
BUN SERPL-MCNC: 119 MG/DL — HIGH (ref 7–23)
CALCIUM SERPL-MCNC: 9 MG/DL — SIGNIFICANT CHANGE UP (ref 8.5–10.1)
CHLORIDE SERPL-SCNC: 95 MMOL/L — LOW (ref 96–108)
CO2 SERPL-SCNC: 33 MMOL/L — HIGH (ref 22–31)
CREAT SERPL-MCNC: 3.1 MG/DL — HIGH (ref 0.5–1.3)
GLUCOSE SERPL-MCNC: 140 MG/DL — HIGH (ref 70–99)
HCO3 BLDA-SCNC: 27 MMOL/L — SIGNIFICANT CHANGE UP (ref 23–27)
HCT VFR BLD CALC: 37.3 % — SIGNIFICANT CHANGE UP (ref 34.5–45)
HGB BLD-MCNC: 12.5 G/DL — SIGNIFICANT CHANGE UP (ref 11.5–15.5)
HOROWITZ INDEX BLDA+IHG-RTO: 32 — SIGNIFICANT CHANGE UP
MCHC RBC-ENTMCNC: 31.2 PG — SIGNIFICANT CHANGE UP (ref 27–34)
MCHC RBC-ENTMCNC: 33.5 GM/DL — SIGNIFICANT CHANGE UP (ref 32–36)
MCV RBC AUTO: 93.2 FL — SIGNIFICANT CHANGE UP (ref 80–100)
PCO2 BLDA: 56 MMHG — HIGH (ref 32–46)
PH BLDA: 7.34 — LOW (ref 7.35–7.45)
PLATELET # BLD AUTO: 193 K/UL — SIGNIFICANT CHANGE UP (ref 150–400)
PO2 BLDA: 79 MMHG — SIGNIFICANT CHANGE UP (ref 74–108)
POTASSIUM SERPL-MCNC: 4.1 MMOL/L — SIGNIFICANT CHANGE UP (ref 3.5–5.3)
POTASSIUM SERPL-SCNC: 4.1 MMOL/L — SIGNIFICANT CHANGE UP (ref 3.5–5.3)
RBC # BLD: 4 M/UL — SIGNIFICANT CHANGE UP (ref 3.8–5.2)
RBC # FLD: 15 % — HIGH (ref 10.3–14.5)
SAO2 % BLDA: 95 % — SIGNIFICANT CHANGE UP (ref 92–96)
SODIUM SERPL-SCNC: 138 MMOL/L — SIGNIFICANT CHANGE UP (ref 135–145)
WBC # BLD: 13.6 K/UL — HIGH (ref 3.8–10.5)
WBC # FLD AUTO: 13.6 K/UL — HIGH (ref 3.8–10.5)

## 2018-01-13 PROCEDURE — 99291 CRITICAL CARE FIRST HOUR: CPT

## 2018-01-13 PROCEDURE — 99233 SBSQ HOSP IP/OBS HIGH 50: CPT

## 2018-01-13 PROCEDURE — 71045 X-RAY EXAM CHEST 1 VIEW: CPT | Mod: 26

## 2018-01-13 RX ORDER — ALPRAZOLAM 0.25 MG
0.5 TABLET ORAL EVERY 6 HOURS
Qty: 0 | Refills: 0 | Status: DISCONTINUED | OUTPATIENT
Start: 2018-01-13 | End: 2018-01-14

## 2018-01-13 RX ORDER — HYDROMORPHONE HYDROCHLORIDE 2 MG/ML
0.5 INJECTION INTRAMUSCULAR; INTRAVENOUS; SUBCUTANEOUS EVERY 6 HOURS
Qty: 0 | Refills: 0 | Status: DISCONTINUED | OUTPATIENT
Start: 2018-01-13 | End: 2018-01-14

## 2018-01-13 RX ORDER — BUMETANIDE 0.25 MG/ML
1 INJECTION INTRAMUSCULAR; INTRAVENOUS EVERY 12 HOURS
Qty: 0 | Refills: 0 | Status: DISCONTINUED | OUTPATIENT
Start: 2018-01-13 | End: 2018-01-14

## 2018-01-13 RX ADMIN — Medication 100 MILLIGRAM(S): at 21:43

## 2018-01-13 RX ADMIN — Medication 3 MILLILITER(S): at 02:39

## 2018-01-13 RX ADMIN — Medication 0.5 MILLIGRAM(S): at 20:50

## 2018-01-13 RX ADMIN — BUMETANIDE 1 MILLIGRAM(S): 0.25 INJECTION INTRAMUSCULAR; INTRAVENOUS at 06:16

## 2018-01-13 RX ADMIN — APIXABAN 2.5 MILLIGRAM(S): 2.5 TABLET, FILM COATED ORAL at 06:02

## 2018-01-13 RX ADMIN — HYDROMORPHONE HYDROCHLORIDE 0.5 MILLIGRAM(S): 2 INJECTION INTRAMUSCULAR; INTRAVENOUS; SUBCUTANEOUS at 21:35

## 2018-01-13 RX ADMIN — Medication 2 SPRAY(S): at 11:31

## 2018-01-13 RX ADMIN — OLANZAPINE 5 MILLIGRAM(S): 15 TABLET, FILM COATED ORAL at 10:51

## 2018-01-13 RX ADMIN — Medication 600 MILLIGRAM(S): at 06:00

## 2018-01-13 RX ADMIN — Medication 20 MILLIGRAM(S): at 06:00

## 2018-01-13 RX ADMIN — Medication 20 MILLIGRAM(S): at 17:23

## 2018-01-13 RX ADMIN — Medication 25 MILLIGRAM(S): at 06:00

## 2018-01-13 RX ADMIN — Medication 100 MILLIGRAM(S): at 06:00

## 2018-01-13 RX ADMIN — TIOTROPIUM BROMIDE 1 CAPSULE(S): 18 CAPSULE ORAL; RESPIRATORY (INHALATION) at 06:00

## 2018-01-13 RX ADMIN — APIXABAN 2.5 MILLIGRAM(S): 2.5 TABLET, FILM COATED ORAL at 17:23

## 2018-01-13 RX ADMIN — Medication 600 MILLIGRAM(S): at 17:23

## 2018-01-13 RX ADMIN — PANTOPRAZOLE SODIUM 40 MILLIGRAM(S): 20 TABLET, DELAYED RELEASE ORAL at 06:00

## 2018-01-13 RX ADMIN — BUMETANIDE 1 MILLIGRAM(S): 0.25 INJECTION INTRAMUSCULAR; INTRAVENOUS at 17:23

## 2018-01-13 RX ADMIN — SIMVASTATIN 40 MILLIGRAM(S): 20 TABLET, FILM COATED ORAL at 21:43

## 2018-01-13 RX ADMIN — NYSTATIN CREAM 1 APPLICATION(S): 100000 CREAM TOPICAL at 06:01

## 2018-01-13 RX ADMIN — HYDROMORPHONE HYDROCHLORIDE 0.5 MILLIGRAM(S): 2 INJECTION INTRAMUSCULAR; INTRAVENOUS; SUBCUTANEOUS at 21:16

## 2018-01-13 RX ADMIN — Medication 3 MILLILITER(S): at 20:50

## 2018-01-13 NOTE — PROGRESS NOTE ADULT - SUBJECTIVE AND OBJECTIVE BOX
Neurology follow up note  COVERING DR HERRERA WILKS88yFemale      Interval History:    Patient feels ok no new complaints.    MEDICATIONS    acetaminophen   Tablet 650 milliGRAM(s) Oral every 6 hours PRN  acetaminophen   Tablet. 650 milliGRAM(s) Oral every 6 hours PRN  ALBUTerol/ipratropium for Nebulization 3 milliLiter(s) Nebulizer every 6 hours  apixaban 2.5 milliGRAM(s) Oral every 12 hours  benzonatate 100 milliGRAM(s) Oral three times a day  buDESOnide   0.5 milliGRAM(s) Respule 0.5 milliGRAM(s) Inhalation two times a day  buMETAnide Injectable 1 milliGRAM(s) IV Push every 12 hours  fluticasone propionate 50 MICROgram(s)/spray Nasal Spray 2 Spray(s) Both Nostrils daily  guaiFENesin    Syrup 100 milliGRAM(s) Oral every 6 hours PRN  guaiFENesin  milliGRAM(s) Oral every 12 hours  metoprolol succinate ER 25 milliGRAM(s) Oral daily  nystatin Powder 1 Application(s) Topical two times a day  OLANZapine Disintegrating Tablet 5 milliGRAM(s) Oral every 6 hours PRN  OLANZapine Injectable 5 milliGRAM(s) IntraMuscular every 6 hours PRN  pantoprazole    Tablet 40 milliGRAM(s) Oral before breakfast  predniSONE   Tablet 20 milliGRAM(s) Oral two times a day  QUEtiapine 12.5 milliGRAM(s) Oral at bedtime  simvastatin 40 milliGRAM(s) Oral at bedtime  sodium chloride 0.45%. 1000 milliLiter(s) IV Continuous <Continuous>  tiotropium 18 MICROgram(s) Capsule 1 Capsule(s) Inhalation daily      Allergies    adhesives (Rash)  latex (Rash)  penicillin (Rash)  penicillins (Urticaria; Rash)  shellfish (Rash)    Intolerances            Vital Signs Last 24 Hrs  T(C): 36.7 (13 Jan 2018 04:00), Max: 36.7 (13 Jan 2018 04:00)  T(F): 98.1 (13 Jan 2018 04:00), Max: 98.1 (13 Jan 2018 04:00)  HR: 86 (13 Jan 2018 04:00) (86 - 99)  BP: 149/56 (13 Jan 2018 04:00) (149/56 - 172/61)  BP(mean): --  RR: 17 (13 Jan 2018 04:00) (17 - 18)  SpO2: 100% (13 Jan 2018 04:00) (97% - 100%)      REVIEW OF SYSTEMS:  Limit or unable to obtain secondary to patient's poor mental status.      On Neurological Examination:    Mental Status - Patient is alert, awake confused       Follow simple commands    Speech -   Fluent      Cranial Nerves - Pupils 3 mm equal and reactive to light,   extraocular eye movements intact.   smile symmetric  intact bilateral NLF    Motor Exam -   With stimuli positive movement of all 4 extremities    Muscle tone - is normal all over.  No asymmetry is seen.        GENERAL Exam: Nontoxic , No Acute Distress   	  HEENT:  normocephalic, atraumatic  		  LUNGS:   Decreased bilaterally  	  HEART: Normal S1S2   No murmur RRR        	  GI/ ABDOMEN:  Soft  Non tender    EXTREMITIES:   No Edema  No Clubbing  No Cyanosis No Edema  	   SKIN: Normal  No Ecchymosis               LABS:  CBC Full  -  ( 13 Jan 2018 08:48 )  WBC Count : 13.6 K/uL  Hemoglobin : 12.5 g/dL  Hematocrit : 37.3 %  Platelet Count - Automated : 193 K/uL  Mean Cell Volume : 93.2 fl  Mean Cell Hemoglobin : 31.2 pg  Mean Cell Hemoglobin Concentration : 33.5 gm/dL  Auto Neutrophil # : x  Auto Lymphocyte # : x  Auto Monocyte # : x  Auto Eosinophil # : x  Auto Basophil # : x  Auto Neutrophil % : x  Auto Lymphocyte % : x  Auto Monocyte % : x  Auto Eosinophil % : x  Auto Basophil % : x      01-13    138  |  95<L>  |  119<H>  ----------------------------<  140<H>  4.1   |  33<H>  |  3.10<H>    Ca    9.0      13 Jan 2018 08:48      Hemoglobin A1C:       Vitamin B12         RADIOLOGY    ASSESSMENT AND PLAN     1-mental status likely represents toxic metabolic encephalopathy-- possible mutilatoral possible medication solumedrol , possible respiratory issues check ABG, rule out infection  possible hospital setting -- will check ct head  aspiration precautions      45minutes spent on total encounter; more than 50% of the visit was spent counseling and/or coordinating care by the attending physician.

## 2018-01-13 NOTE — PROGRESS NOTE ADULT - PROBLEM SELECTOR PLAN 6
Continue metoprolol
Rate controlled  On eliquis -- change to coumadin??  Cardio consult
Rate controlled  On eliquis -- change to coumadin??  Cardio consult

## 2018-01-13 NOTE — PROGRESS NOTE ADULT - PROBLEM SELECTOR PROBLEM 6
Hypertension, unspecified type
Atrial fibrillation, unspecified type
Atrial fibrillation, unspecified type

## 2018-01-13 NOTE — CONSULT NOTE ADULT - SUBJECTIVE AND OBJECTIVE BOX
Patient is a 88y old  Female who presents with a chief complaint of SOB, sri. LE edema (10 Pieter 2018 16:27)      89 y/o female pmhx of COPD on home O2, HTN, CKD recently d/c from rehab who was presented w/ cough/ congestion. Pt admitted w/     PAST MEDICAL & SURGICAL HISTORY:  Cancer: bladder  Hyperlipidemia  COPD, moderate: uses o2 at home  AAA (abdominal aortic aneurysm): incisional hernia  Hypertension  Anxiety  Otosclerosis, left: [place]- left ear  GERD (Gastroesophageal Reflux Disease)  AAA (Abdominal Aortic Aneurysm)  Arthritis of Knee  Hx of Bladder Cancer: Late 1990&#x27;s  Hyperlipidemia  Atrial Fibrillation  HTN - Hypertension  Emphysema  S/P breast lumpectomy  S/P hysterectomy  S/P cataract surgery: 2012 - bilateral eyes  S/P AAA repair: 11/2011  History of Dilatation and Curettage x3: 38 years ago  Transurethral resection of bladder tumor x2: Late 1990&#x27;s - x 2 TURBT  History of Total Hysterectomy with Removal of Both Tubes and Ovaries: 40 years ago  History of Tonsillectomy: 60 years ago    Allergies    adhesives (Rash)  latex (Rash)  penicillin (Rash)  penicillins (Urticaria; Rash)  shellfish (Rash)    Intolerances      FAMILY HISTORY:  No pertinent family history in first degree relatives      Review of Systems:  CONSTITUTIONAL: No fever, chills, or fatigue  EYES: No eye pain, visual disturbances, or discharge  ENMT:  No difficulty hearing, tinnitus, vertigo; No sinus or throat pain  NECK: No pain or stiffness  RESPIRATORY: No cough, wheezing, chills or hemoptysis; No shortness of breath  CARDIOVASCULAR: No chest pain, palpitations, dizziness, or leg swelling  GASTROINTESTINAL: No abdominal or epigastric pain. No nausea, vomiting, or hematemesis; No diarrhea or constipation. No melena or hematochezia.  GENITOURINARY: No dysuria, frequency, hematuria, or incontinence  NEUROLOGICAL: No headaches, memory loss, loss of strength, numbness, or tremors  SKIN: No itching, burning, rashes, or lesions   MUSCULOSKELETAL: No joint pain or swelling; No muscle, back, or extremity pain  PSYCHIATRIC: No depression, anxiety, mood swings, or difficulty sleeping      Medications:    buMETAnide 1 milliGRAM(s) Oral every 12 hours  metoprolol succinate ER 25 milliGRAM(s) Oral daily    ALBUTerol/ipratropium for Nebulization 3 milliLiter(s) Nebulizer every 6 hours  benzonatate 100 milliGRAM(s) Oral three times a day  buDESOnide   0.5 milliGRAM(s) Respule 0.5 milliGRAM(s) Inhalation two times a day  guaiFENesin    Syrup 100 milliGRAM(s) Oral every 6 hours PRN  guaiFENesin  milliGRAM(s) Oral every 12 hours  tiotropium 18 MICROgram(s) Capsule 1 Capsule(s) Inhalation daily    acetaminophen   Tablet 650 milliGRAM(s) Oral every 6 hours PRN  acetaminophen   Tablet. 650 milliGRAM(s) Oral every 6 hours PRN  OLANZapine Disintegrating Tablet 5 milliGRAM(s) Oral every 6 hours PRN  OLANZapine Injectable 5 milliGRAM(s) IntraMuscular every 6 hours PRN  QUEtiapine 12.5 milliGRAM(s) Oral at bedtime        pantoprazole    Tablet 40 milliGRAM(s) Oral before breakfast      predniSONE   Tablet 20 milliGRAM(s) Oral two times a day  simvastatin 40 milliGRAM(s) Oral at bedtime    sodium chloride 0.45%. 1000 milliLiter(s) IV Continuous <Continuous>      fluticasone propionate 50 MICROgram(s)/spray Nasal Spray 2 Spray(s) Both Nostrils daily  nystatin Powder 1 Application(s) Topical two times a day            ICU Vital Signs Last 24 Hrs  T(C): 36.7 (13 Jan 2018 04:00), Max: 36.7 (13 Jan 2018 04:00)  T(F): 98.1 (13 Jan 2018 04:00), Max: 98.1 (13 Jan 2018 04:00)  HR: 86 (13 Jan 2018 04:00) (86 - 99)  BP: 149/56 (13 Jan 2018 04:00) (149/56 - 172/61)  BP(mean): --  ABP: --  ABP(mean): --  RR: 17 (13 Jan 2018 04:00) (17 - 18)  SpO2: 100% (13 Jan 2018 04:00) (97% - 100%)    Vital Signs Last 24 Hrs  T(C): 36.7 (13 Jan 2018 04:00), Max: 36.7 (13 Jan 2018 04:00)  T(F): 98.1 (13 Jan 2018 04:00), Max: 98.1 (13 Jan 2018 04:00)  HR: 86 (13 Jan 2018 04:00) (86 - 99)  BP: 149/56 (13 Jan 2018 04:00) (149/56 - 172/61)  BP(mean): --  RR: 17 (13 Jan 2018 04:00) (17 - 18)  SpO2: 100% (13 Jan 2018 04:00) (97% - 100%)    ABG - ( 13 Jan 2018 15:16 )  pH: 7.34  /  pCO2: 56    /  pO2: 79    / HCO3: 27    / Base Excess: 4.0   /  SaO2: 95                  I&O's Detail    12 Jan 2018 07:01  -  13 Jan 2018 07:00  --------------------------------------------------------  IN:    Oral Fluid: 240 mL  Total IN: 240 mL    OUT:    Voided: 200 mL  Total OUT: 200 mL    Total NET: 40 mL            LABS:                        12.5   13.6  )-----------( 193      ( 13 Jan 2018 08:48 )             37.3     01-13    138  |  95<L>  |  119<H>  ----------------------------<  140<H>  4.1   |  33<H>  |  3.10<H>    Ca    9.0      13 Jan 2018 08:48            CAPILLARY BLOOD GLUCOSE            CULTURES:  Culture Results:   No growth to date. (01-10 @ 08:36)  Culture Results:   No growth to date. (01-10 @ 08:36)      Physical Examination:    General: No acute distress.  Alert, oriented, interactive, nonfocal    HEENT: Pupils equal, reactive to light.  Symmetric.    PULM: Clear to auscultation bilaterally, no significant sputum production    CVS: Regular rate and rhythm, no murmurs, rubs, or gallops    ABD: Soft, nondistended, nontender, normoactive bowel sounds, no masses    EXT: No edema, nontender    SKIN: Warm and well perfused, no rashes noted.    NEURO: A&Ox3, strength 5/5 all extremities, cranial nerves grossly intact, no focal deficits    RADIOLOGY: ***    CRITICAL CARE TIME SPENT: ***  Evaluating/treating patient, reviewing data/labs/imaging, discussing case with multidisciplinary team, discussing plan/goals of care with patient/family. Non-inclusive of procedure time. Patient is a 88y old  Female who presents with a chief complaint of SOB, sri. LE edema (10 Pieter 2018 16:27)      87 y/o female pmhx of COPD on home O2, HTN, CKD recently d/c from rehab who was presented w/ cough/ congestion. Pt admitted w/ pneumonia 2/2 hMPV and COPD exacerbation. Pt seen today w/ worsening respiratory distress and encephalopathy. Pt admits to SOB. She denies CP, abdominal pain, N/V, palpitations.     PAST MEDICAL & SURGICAL HISTORY:  Cancer: bladder  Hyperlipidemia  COPD, moderate: uses o2 at home  AAA (abdominal aortic aneurysm): incisional hernia  Hypertension  Anxiety  Otosclerosis, left: [place]- left ear  GERD (Gastroesophageal Reflux Disease)  AAA (Abdominal Aortic Aneurysm)  Arthritis of Knee  Hx of Bladder Cancer: Late 1990&#x27;s  Hyperlipidemia  Atrial Fibrillation  HTN - Hypertension  Emphysema  S/P breast lumpectomy  S/P hysterectomy  S/P cataract surgery: 2012 - bilateral eyes  S/P AAA repair: 11/2011  History of Dilatation and Curettage x3: 38 years ago  Transurethral resection of bladder tumor x2: Late 1990&#x27;s - x 2 TURBT  History of Total Hysterectomy with Removal of Both Tubes and Ovaries: 40 years ago  History of Tonsillectomy: 60 years ago    Allergies    adhesives (Rash)  latex (Rash)  penicillin (Rash)  penicillins (Urticaria; Rash)  shellfish (Rash)    Intolerances      FAMILY HISTORY:  No pertinent family history in first degree relatives      Review of Systems:  CONSTITUTIONAL: No fever, chills, or fatigue  EYES: No eye pain, visual disturbances, or discharge  ENMT:  No difficulty hearing, tinnitus, vertigo; No sinus or throat pain  NECK: No pain or stiffness  RESPIRATORY: + SOB. NO cough, wheezing, chills or hemoptysis;   CARDIOVASCULAR: No chest pain, palpitations, dizziness, or leg swelling  GASTROINTESTINAL: No abdominal or epigastric pain. No nausea, vomiting, or hematemesis; No diarrhea or constipation. No melena or hematochezia.  GENITOURINARY: No dysuria, frequency, hematuria, or incontinence  NEUROLOGICAL: No headaches, memory loss, loss of strength, numbness, or tremors  SKIN: No itching, burning, rashes, or lesions       Medications:    buMETAnide 1 milliGRAM(s) Oral every 12 hours  metoprolol succinate ER 25 milliGRAM(s) Oral daily  ALBUTerol/ipratropium for Nebulization 3 milliLiter(s) Nebulizer every 6 hours  benzonatate 100 milliGRAM(s) Oral three times a day  buDESOnide   0.5 milliGRAM(s) Respule 0.5 milliGRAM(s) Inhalation two times a day  guaiFENesin    Syrup 100 milliGRAM(s) Oral every 6 hours PRN  guaiFENesin  milliGRAM(s) Oral every 12 hours  tiotropium 18 MICROgram(s) Capsule 1 Capsule(s) Inhalation daily  acetaminophen   Tablet 650 milliGRAM(s) Oral every 6 hours PRN  acetaminophen   Tablet. 650 milliGRAM(s) Oral every 6 hours PRN  OLANZapine Disintegrating Tablet 5 milliGRAM(s) Oral every 6 hours PRN  OLANZapine Injectable 5 milliGRAM(s) IntraMuscular every 6 hours PRN  QUEtiapine 12.5 milliGRAM(s) Oral at bedtime  pantoprazole    Tablet 40 milliGRAM(s) Oral before breakfast  predniSONE   Tablet 20 milliGRAM(s) Oral two times a day  simvastatin 40 milliGRAM(s) Oral at bedtime  sodium chloride 0.45%. 1000 milliLiter(s) IV Continuous <Continuous>  fluticasone propionate 50 MICROgram(s)/spray Nasal Spray 2 Spray(s) Both Nostrils daily  nystatin Powder 1 Application(s) Topical two times a day            ICU Vital Signs Last 24 Hrs  T(C): 36.7 (13 Jan 2018 04:00), Max: 36.7 (13 Jan 2018 04:00)  T(F): 98.1 (13 Jan 2018 04:00), Max: 98.1 (13 Jan 2018 04:00)  HR: 86 (13 Jan 2018 04:00) (86 - 99)  BP: 149/56 (13 Jan 2018 04:00) (149/56 - 172/61)  RR: 17 (13 Jan 2018 04:00) (17 - 18)  SpO2: 100% (13 Jan 2018 04:00) (97% - 100%)      ABG - ( 13 Jan 2018 15:16 )  pH: 7.34  /  pCO2: 56    /  pO2: 79    / HCO3: 27    / Base Excess: 4.0   /  SaO2: 95                  I&O's Detail    12 Jan 2018 07:01  -  13 Jan 2018 07:00  --------------------------------------------------------  IN:    Oral Fluid: 240 mL  Total IN: 240 mL    OUT:    Voided: 200 mL  Total OUT: 200 mL    Total NET: 40 mL            LABS:                        12.5   13.6  )-----------( 193      ( 13 Jan 2018 08:48 )             37.3     01-13    138  |  95<L>  |  119<H>  ----------------------------<  140<H>  4.1   |  33<H>  |  3.10<H>    Ca    9.0      13 Jan 2018 08:48            CAPILLARY BLOOD GLUCOSE            CULTURES:  Culture Results:   No growth to date. (01-10 @ 08:36)  Culture Results:   No growth to date. (01-10 @ 08:36)      Physical Examination:    General: mild respiratory distress. AAOx3.      HEENT: Pupils equal, reactive to light.  Symmetric.    PULM: decreased air entry b/l. b/l end expiratory wheeze.     CVS: +S1/S2. NSR no murmurs     ABD: Soft, nondistended, nontender, normoactive bowel sounds,    EXT: No edema, nontender    SKIN: Warm and well perfused,    NEURO: A&Ox3, strength 4/5 all extremities, o focal deficits    RADIOLOGY: < from: Xray Chest 1 View AP/PA (01.13.18 @ 17:43) >  NTERPRETATION:  AP chest on January 13, 2018 at 5:31 PM. Patient is   short of breath.    Film rotated to the left obscuring left base. Patient's chin obscures the   apices.    Heart is likely enlarged.    Small left base pleural pulmonary process and mild right base atelectasis   again noted.    Chest is similar to January 9 of this year.    IMPRESSION: Unchanged chest as above.    < end of copied text >      CRITICAL CARE TIME SPENT: 35 min   Evaluating/treating patient, reviewing data/labs/imaging, discussing case with multidisciplinary team, discussing plan/goals of care with patient/family. Non-inclusive of procedure time.

## 2018-01-13 NOTE — PROGRESS NOTE ADULT - PROBLEM SELECTOR PLAN 3
Multifactorial -- sundowning, steroid psychosis, r/o hypoxemia/hypercapenia  Check ABG  Continue zyprexa prn  Neuro/psych f/u
FITZ on CKD  -- worsening  hold all nephrotoxic medications  restarted on diuretics  Serial labs  Renal f/u
FITZ on CKD  hold all nephrotoxic medications  restarted on diuretics  Serial labs  Renal f/u  Gentle hydration x 20 h

## 2018-01-13 NOTE — PROGRESS NOTE ADULT - PROBLEM SELECTOR PLAN 2
change to po prednisone  hold iv abx for now -- no evidence of PNA  nebulizers  symbicort  oxygen  pulmonary consult

## 2018-01-13 NOTE — PROGRESS NOTE ADULT - ASSESSMENT
COPD/Viral URI/CHF. Nonproteinuric CKD with FITZ. Question of high PVR/Left hydro. Stable renal indices on mandatory diuretic last 48 hours. Primary respiratory acidosis.  Will check bladder scan, may need Wing. Monitor renal indices.  Spoke with family at bedside.

## 2018-01-13 NOTE — PROGRESS NOTE ADULT - ATTENDING COMMENTS
Seen/examined. Breathing has worsened today. Lower extremity edema noted. Decreased lung sounds bilaterally.  Worsening BUN, suggestive of intravascular depletion, in the setting of viral illness. Bumex may be making her worse.  Renal follow up.  ABG unremarkable. I would put her on Bipap for now. Repeat CXR.  Oxygen supplementation as necessary.  MICU evaluation. She has increased work of breathing.  Diastolic dysfunction on echocardiogram  Her renal function is too poor for Eliquis. She should be started on coumadin.
Seen/examined. Agree with above. Remains Short of breath and appears significantly volume overloaded, although her creatinine is worsening. We will see the numbers in the morning and change the plan accordingly.

## 2018-01-13 NOTE — PROGRESS NOTE ADULT - SUBJECTIVE AND OBJECTIVE BOX
Patient is a 88y old  Female who presents with a chief complaint of SOB, sri. LE edema (10 Pieter 2018 16:27)      INTERVAL HPI/OVERNIGHT EVENTS: Patient seen and examined. NAD. Patient very delirius    Vital Signs Last 24 Hrs  T(C): 36.7 (13 Jan 2018 04:00), Max: 36.7 (13 Jan 2018 04:00)  T(F): 98.1 (13 Jan 2018 04:00), Max: 98.1 (13 Jan 2018 04:00)  HR: 86 (13 Jan 2018 04:00) (86 - 99)  BP: 149/56 (13 Jan 2018 04:00) (149/56 - 172/61)  BP(mean): --  RR: 17 (13 Jan 2018 04:00) (17 - 18)  SpO2: 100% (13 Jan 2018 04:00) (97% - 100%)    01-13    138  |  95<L>  |  119<H>  ----------------------------<  140<H>  4.1   |  33<H>  |  3.10<H>    Ca    9.0      13 Jan 2018 08:48                            12.5   13.6  )-----------( 193      ( 13 Jan 2018 08:48 )             37.3       CAPILLARY BLOOD GLUCOSE        acetaminophen   Tablet 650 milliGRAM(s) Oral every 6 hours PRN  acetaminophen   Tablet. 650 milliGRAM(s) Oral every 6 hours PRN  ALBUTerol/ipratropium for Nebulization 3 milliLiter(s) Nebulizer every 6 hours  apixaban 2.5 milliGRAM(s) Oral every 12 hours  benzonatate 100 milliGRAM(s) Oral three times a day  buDESOnide   0.5 milliGRAM(s) Respule 0.5 milliGRAM(s) Inhalation two times a day  buMETAnide Injectable 1 milliGRAM(s) IV Push every 12 hours  fluticasone propionate 50 MICROgram(s)/spray Nasal Spray 2 Spray(s) Both Nostrils daily  guaiFENesin    Syrup 100 milliGRAM(s) Oral every 6 hours PRN  guaiFENesin  milliGRAM(s) Oral every 12 hours  methylPREDNISolone sodium succinate Injectable 20 milliGRAM(s) IV Push two times a day  metoprolol succinate ER 25 milliGRAM(s) Oral daily  nystatin Powder 1 Application(s) Topical two times a day  OLANZapine Disintegrating Tablet 5 milliGRAM(s) Oral every 6 hours PRN  OLANZapine Injectable 5 milliGRAM(s) IntraMuscular every 6 hours PRN  pantoprazole    Tablet 40 milliGRAM(s) Oral before breakfast  QUEtiapine 12.5 milliGRAM(s) Oral at bedtime  simvastatin 40 milliGRAM(s) Oral at bedtime  sodium chloride 0.45%. 1000 milliLiter(s) IV Continuous <Continuous>  tiotropium 18 MICROgram(s) Capsule 1 Capsule(s) Inhalation daily      REVIEW OF SYSTEMS:  CONSTITUTIONAL: No fever, no weight loss, or no fatigue  NECK: No pain, no stiffness  RESPIRATORY: No cough, no wheezing, no chills, no hemoptysis, No shortness of breath  CARDIOVASCULAR: No chest pain, no palpitations, no dizziness, no leg swelling  GASTROINTESTINAL: No abdominal pain. No nausea, no vomiting, no hematemesis; No diarrhea, no constipation. No melena, no hematochezia.  GENITOURINARY: No dysuria, no frequency, no hematuria, no incontinence  NEUROLOGICAL: No headaches, no loss of strength, no numbness, no tremors  SKIN: No itching, no burning  MUSCULOSKELETAL: No joint pain, no swelling; No muscle, no back, no extremity pain  PSYCHIATRIC: very delirius  HEME/LYMPH: No easy bruising, no bleeding gums  ALLERY AND IMMUNOLOGIC: No hives       Consultant(s) Notes Reviewed:  [x ] YES  [ ] NO    PHYSICAL EXAM:  GENERAL: NAD  HEAD:  Atraumatic, Normocephalic  EYES: EOMI, PERRLA, conjunctiva and sclera clear  ENMT: No tonsillar erythema, exudates, or enlargement; Moist mucous membranes  NECK: Supple, No JVD  NERVOUS SYSTEM:  confused  CHEST/LUNG: Clear to auscultation bilaterally; No rales, rhonchi, wheezing,  HEART: Regular rate and rhythm  ABDOMEN: Soft, Nontender, Nondistended; Bowel sounds present  EXTREMITIES:  No clubbing, cyanosis, or edema  LYMPH: No lymphadenopathy noted  SKIN: No rashes      Advanced care planning discussed with patient/family [x ] YES   [ ] NO    Advanced care planning discussed with patient/family. Advanced care planning forms reviewed/discussed/completed. 20 minutes spent.

## 2018-01-13 NOTE — CONSULT NOTE ADULT - ATTENDING COMMENTS
Patient seen and evaluated independently from above.    THis is an 88-year-old female with a historyof advanced COPD on home oxygen with chronic hypercapnia, HTN, and CKD who presents with worsening dyspnea, cough, and chest congestion, found to have acute exacerbation of COPD due to human metapneumovirus. Today, it was noticed that patient has worsening dyspnea and work of breathing, with tripod position in bed, pursed lips. Also with encephalopathy and occasional agitation due to steroid psychosis. ABG revealed chronic hypercapnic respiratory failure. She was placed on bipap for work of breathing, now on 12/5 50%, having a hard time to keep the mask on due to anxiety/agitation, requiring frequent redirection.    We had a goals of care conversation with the patient and her daughter. Initially, patient stated that she would not want to be placed on a ventilator and would definitely not want a tracheostomy. However, upon confirmation of the DNR/DNI, patient wanted to speak with daughter first. I explained to them that her risk of prolonged respiratory failure is high given her end-stage COPD with minimal functional status and chronic hypoxia/hypercapnia. They will decide regarding goals of care    For now:  - Place on remote tele  - Continue BiPAP qhs and prn day  - No urgent indication for ICU care at this time given compensated hypercapnia, improved work of breathing with BiPAP  - Continue steroids, taper as tolerated given her encephalopathy  - Poor prognosis, f/up [facility]    CC time spent: 45 min
Chart reviewed    Patient seen and examined    Agree with plan as outlined above

## 2018-01-13 NOTE — PROGRESS NOTE ADULT - PROBLEM SELECTOR PLAN 1
ckd, tatum, copd, chf, valvular heart disease  delirium - will taper steroids, reorient patient  HOB elev  oral hygiene  skin care  assist with ADL  monitor vs and HD  on IVF as per renal  cvs regimen  am labs pending  COPD - Systemic steroids, NEBS, Inhalers, 02 support, keep sat > 88 pct  overall prognosis is very poor !!!
viral syndrome  supportive care  COPD regimen  CT chest - no acute consolidation  no evidence of PNA  monitor vs and HD  assist with ADL
Contact precautions  Nebulizers  Cough suppressants  Oxygen  Supportive care  Pulmonary f/u

## 2018-01-13 NOTE — CONSULT NOTE ADULT - ASSESSMENT
87 y/o female pmhx of COPD on home O2, HTN, CKD recently d/c from rehab who was presented w/ cough/ congestion. Pt admitted w/ pneumonia 2/2 hMPV and COPD exacerbation. Pt w/ increased work of breathing, placed on remote tele and given trial of BiPAP. Pts ABG prior to BiPAP was unremarkable showing no acute hypoxia or hypercapnia. Pt repositioned in bed w/ BiPAP mask on, needs redirection to keep mask  on at times.  She is of high risk for ICU admission and/or intubation if she continues w/ her work of breathing.     Would Recommend:  - BiPAP at night, would titrate settings to ensure adequate TV and maintain SaO2 >88%  - duonebs PRN  - steroids/spiriva  - worsening metabolic encephalopathy likely due to steroid psychosis vs sundowning, as ABG rules out hypercapnia as cause. c/w zyprexa       Case discussed w/ ICU attending Dr. Dahl 89 y/o female pmhx of COPD on home O2, HTN, CKD recently d/c from rehab who was presented w/ cough/ congestion. Pt admitted w/ pneumonia 2/2 hMPV and COPD exacerbation. Pt w/ increased work of breathing, placed on remote tele and given trial of BiPAP. Pts ABG prior to BiPAP was unremarkable showing no acute hypoxia or hypercapnia. Pt repositioned in bed w/ BiPAP mask on, needs redirection to keep mask on at times due her encephaloapthy. She is at an increased risk for ICU admission and/or intubation if she continues w/ her work of breathing and begins to decompensate.     Would Recommend:  - BiPAP at night, would titrate settings to ensure adequate TV and maintain SaO2 >88%  -Keep HOB elevated   - duonebs PRN  - steroids /spiriva  - worsening metabolic encephalopathy likely due to steroid psychosis vs sundowning, as ABG rules out hypercapnia as cause. c/w zyprexa     Case discussed w/ ICU attending Dr. Dahl

## 2018-01-13 NOTE — PROGRESS NOTE ADULT - PROBLEM SELECTOR PLAN 4
FITZ on CKD  -- stable  hold all nephrotoxic medications  restarted on diuretics  Serial labs  Renal f/u
Check ECHO  Started on bumex  Cardio consult
ECHO noted -- diastolic dysfunction  Started on bumex  Cardio consult

## 2018-01-13 NOTE — PROGRESS NOTE ADULT - PROBLEM SELECTOR PLAN 5
ECHO noted -- diastolic dysfunction  Started on bumex  Cardio consult
Continue metoprolol
Continue metoprolol

## 2018-01-13 NOTE — PROGRESS NOTE ADULT - SUBJECTIVE AND OBJECTIVE BOX
Patient seen in follow up for FITZ on CKD. Preently on BIPAP, [place], restless, NAD. States that she feels better since admission.    Cancer  Hyperlipidemia  COPD, moderate  AAA (abdominal aortic aneurysm)  Hypertension  Anxiety  Otosclerosis, left  GERD (Gastroesophageal Reflux Disease)  AAA (Abdominal Aortic Aneurysm)  Cataract, bilateral  Arthritis of Knee  Hx of Bladder Cancer  Hyperlipidemia  Atrial Fibrillation  HTN - Hypertension  Emphysema    MEDICATIONS  (STANDING):  ALBUTerol/ipratropium for Nebulization 3 milliLiter(s) Nebulizer every 6 hours  benzonatate 100 milliGRAM(s) Oral three times a day  buDESOnide   0.5 milliGRAM(s) Respule 0.5 milliGRAM(s) Inhalation two times a day  buMETAnide 1 milliGRAM(s) Oral every 12 hours  fluticasone propionate 50 MICROgram(s)/spray Nasal Spray 2 Spray(s) Both Nostrils daily  guaiFENesin  milliGRAM(s) Oral every 12 hours  metoprolol succinate ER 25 milliGRAM(s) Oral daily  nystatin Powder 1 Application(s) Topical two times a day  pantoprazole    Tablet 40 milliGRAM(s) Oral before breakfast  predniSONE   Tablet 20 milliGRAM(s) Oral two times a day  QUEtiapine 12.5 milliGRAM(s) Oral at bedtime  simvastatin 40 milliGRAM(s) Oral at bedtime  tiotropium 18 MICROgram(s) Capsule 1 Capsule(s) Inhalation daily    MEDICATIONS  (PRN):  acetaminophen   Tablet 650 milliGRAM(s) Oral every 6 hours PRN For Temp greater than 38 C (100.4 F)  acetaminophen   Tablet. 650 milliGRAM(s) Oral every 6 hours PRN Mild Pain (1 - 3)  ALPRAZolam 0.5 milliGRAM(s) Oral every 6 hours PRN Anxiety  guaiFENesin    Syrup 100 milliGRAM(s) Oral every 6 hours PRN Cough  HYDROmorphone  Injectable 0.5 milliGRAM(s) IV Push every 6 hours PRN Dyspnea  OLANZapine Disintegrating Tablet 5 milliGRAM(s) Oral every 6 hours PRN Agitation  OLANZapine Injectable 5 milliGRAM(s) IntraMuscular every 6 hours PRN Acute agitation    T(C): 36.3 (01-13-18 @ 21:26), Max: 36.8 (01-12-18 @ 14:19)  HR: 74 (01-13-18 @ 21:26) (74 - 99)  BP: 133/60 (01-13-18 @ 21:26) (133/60 - 172/61)  RR: 18 (01-13-18 @ 21:26) (17 - 18)  SpO2: 99% (01-13-18 @ 21:26) (95% - 100%)  Wt(kg): --  I&O's Detail    12 Jan 2018 07:01  -  13 Jan 2018 07:00  --------------------------------------------------------  IN:    Oral Fluid: 240 mL  Total IN: 240 mL    OUT:    Voided: 200 mL  Total OUT: 200 mL    Total NET: 40 mL      PHYSICAL EXAM:  General: NAD, on BIPAP  No JVD  Respiratory: b/l air entry, shallow  Cardiovascular: S1 S2 reg  Gastrointestinal: soft, BS present  Extremities: no edema     CBC Full  -  ( 13 Jan 2018 08:48 )  WBC Count : 13.6 K/uL  Hemoglobin : 12.5 g/dL  Hematocrit : 37.3 %  Platelet Count - Automated : 193 K/uL  Mean Cell Volume : 93.2 fl  Mean Cell Hemoglobin : 31.2 pg  Mean Cell Hemoglobin Concentration : 33.5 gm/dL  Auto Neutrophil # : x  Auto Lymphocyte # : x  Auto Monocyte # : x  Auto Eosinophil # : x  Auto Basophil # : x  Auto Neutrophil % : x  Auto Lymphocyte % : x  Auto Monocyte % : x  Auto Eosinophil % : x  Auto Basophil % : x    01-13    138  |  95<L>  |  119<H>  ----------------------------<  140<H>  4.1   |  33<H>  |  3.10<H>    Ca    9.0      13 Jan 2018 08:48      ABG - ( 13 Jan 2018 15:16 )  pH: 7.34  /  pCO2: 56    /  pO2: 79    / HCO3: 27    / Base Excess: 4.0   /  SaO2: 95                  Sodium, Serum: 138 (01-13 @ 08:48)  Sodium, Serum: 133 (01-12 @ 10:00)  Sodium, Serum: 136 (01-11 @ 06:18)    Creatinine, Serum: 3.10 (01-13 @ 08:48)  Creatinine, Serum: 3.30 (01-12 @ 10:00)  Creatinine, Serum: 3.10 (01-11 @ 06:18)    Potassium, Serum: 4.1 (01-13 @ 08:48)  Potassium, Serum: 4.0 (01-12 @ 10:00)  Potassium, Serum: 4.2 (01-11 @ 06:18)    Hemoglobin: 12.5 (01-13 @ 08:48)

## 2018-01-13 NOTE — PROGRESS NOTE ADULT - SUBJECTIVE AND OBJECTIVE BOX
HPI:  89 yo F with PMH of HTN CHF COPD on home O2, CKD who was recently d/c'd from  rehab, p/w cough, congestion x past ~ 3 days. Pt seen by urgent care, on zithromax x past 2 days. Now with inc dyspnea, gen malaise. Pt had bad coughing fit PTA, reportedly was briefly cyanotic. No chest pain. no abd pain. No vomiting /diarrhea. No agg/allev factors. No recent trauma. No recent travel. Some recent inc LE edema, being followed by outpt md with recent increase in lasix. No other inj or co. Patient denies fevers/chills/cp. She reports that her daughter had bronchitis recently. She lives with her daughter. (10 Pieter 2018 10:56)      SUBJECTIVE:  Patient is a 88y old  Female who presents with a chief complaint of SOB, sri. LE edema (10 Pieter 2018 16:27)          OBJECTIVE:  Review Of Systems:  Constitutional: [ ] Fever [ ] Chills [ ] Fatigue [ ] Weight change   HEENT: [ ] Blurred vision [ ] Eye Pain [ ] Headache [ ] Runny nose [ ] Sore Throat   Respiratory: [ ] Cough [x ] Wheezing [x ] Shortness of breath [x] tripoding [x] tachypneic  Cardiovascular: [ ] Chest Pain [ ] Palpitations [ ] HERRING [ ] PND [ ] Orthopnea  Gastrointestinal: [ ] Abdominal Pain [ ] Diarrhea [ ] Constipation [ ] Hemorrhoids [ ] Nausea [ ] Vomiting  Genitourinary: [ ] Nocturia [ ] Dysuria [ ] Incontinence  Extremities: [ ] Swelling [ ] Joint Pain  Neurologic: [ ] Focal deficit [ ] Paresthesias [ ] Syncope  Lymphatic: [ ] Swelling [ ] Lymphadenopathy   Skin: [ ] Rash [ ] Ecchymoses [ ] Wounds [ ] Lesions  Psychiatry: [ ] Depression [ ] Suicidal/Homicidal Ideation [ ] Anxiety [ ] Sleep Disturbances  [x ] 10 point review of systems is otherwise negative except as mentioned above            [ ]Unable to obtain    Allergy:  Allergies    adhesives (Rash)  latex (Rash)  penicillin (Rash)  penicillins (Urticaria; Rash)  shellfish (Rash)    Intolerances        Medications:  MEDICATIONS  (STANDING):  ALBUTerol/ipratropium for Nebulization 3 milliLiter(s) Nebulizer every 6 hours  apixaban 2.5 milliGRAM(s) Oral every 12 hours  benzonatate 100 milliGRAM(s) Oral three times a day  buDESOnide   0.5 milliGRAM(s) Respule 0.5 milliGRAM(s) Inhalation two times a day  buMETAnide Injectable 1 milliGRAM(s) IV Push every 12 hours  fluticasone propionate 50 MICROgram(s)/spray Nasal Spray 2 Spray(s) Both Nostrils daily  guaiFENesin  milliGRAM(s) Oral every 12 hours  metoprolol succinate ER 25 milliGRAM(s) Oral daily  nystatin Powder 1 Application(s) Topical two times a day  pantoprazole    Tablet 40 milliGRAM(s) Oral before breakfast  predniSONE   Tablet 20 milliGRAM(s) Oral two times a day  QUEtiapine 12.5 milliGRAM(s) Oral at bedtime  simvastatin 40 milliGRAM(s) Oral at bedtime  sodium chloride 0.45%. 1000 milliLiter(s) (50 mL/Hr) IV Continuous <Continuous>  tiotropium 18 MICROgram(s) Capsule 1 Capsule(s) Inhalation daily    MEDICATIONS  (PRN):  acetaminophen   Tablet 650 milliGRAM(s) Oral every 6 hours PRN For Temp greater than 38 C (100.4 F)  acetaminophen   Tablet. 650 milliGRAM(s) Oral every 6 hours PRN Mild Pain (1 - 3)  guaiFENesin    Syrup 100 milliGRAM(s) Oral every 6 hours PRN Cough  OLANZapine Disintegrating Tablet 5 milliGRAM(s) Oral every 6 hours PRN Agitation  OLANZapine Injectable 5 milliGRAM(s) IntraMuscular every 6 hours PRN Acute agitation      PMH/PSH/FH/SH: [ ] Unchanged    Vitals:  T(C): 36.7 (18 @ 04:00), Max: 36.7 (18 @ 04:00)  HR: 86 (18 @ 04:00) (86 - 99)  BP: 149/56 (18 @ 04:00) (149/56 - 172/61)  BP(mean): --  RR: 17 (18 @ 04:00) (17 - 18)  SpO2: 100% (18 @ 04:00) (97% - 100%)  Wt(kg): --  Daily     Daily Weight in k (2018 04:00)  I&O's Summary    2018 07:01  -  2018 07:00  --------------------------------------------------------  IN: 240 mL / OUT: 200 mL / NET: 40 mL        Labs:                        12.5   13.6  )-----------( 193      ( 2018 08:48 )             37.3     01-13    138  |  95<L>  |  119<H>  ----------------------------<  140<H>  4.1   |  33<H>  |  3.10<H>    Ca    9.0      2018 08:48                        ECG:  < from: 12 Lead ECG (18 @ 23:49) >  Ventricular Rate 87 BPM    Atrial Rate 326 BPM    QRS Duration 84 ms    Q-T Interval 330 ms    QTC Calculation(Bezet) 397 ms    R Axis 72 degrees    T Axis -64 degrees    Diagnosis Line Atrial flutter with variable AV block with premature ventricular or aberrantly conducted complexes  Septal infarct , age undetermined  ST & T wave abnormality, consider lateral ischemia  Abnormal ECG    Confirmed by Jorge Sharp ([id number]) on 1/10/2018 10:48:41 AM    < end of copied text >    Echo:  < from: TTE Echo Doppler w/o Cont (01.10.18 @ 14:09) >   EXAM:  ECHO TTE W/O CON COMP W/DOPPLR         PROCEDURE DATE:  01/10/2018        INTERPRETATION:  INDICATION: aortic valve disease    Blood Pressure 129/49    Height 170     Weight 78       BSA 1.9    Dimensions:    LA 4.1       Normal Values: 2.0- 4.0 cm    Ao 3.4        Normal Values: 2.0 - 3.8 cm  SEPTUM 1.1       Normal Values: 0.6 - 1.2 cm  PWT 1.1       Normal Values: 0.6 - 1.1 cm  LVIDd 5.0         Normal Values: 3.0 - 5.6 cm  LVIDs 3.3         Normal Values: 1.8 - 4.0 cm    Derived Variables:  LVMI     g/m2  RWT      Fractional Short      Ejection Fraction 65    Doppler Peak v. AoV=   (m/sec)    OBSERVATIONS:    Mitral Valve: normal, mild to moderate MR.  Aortic Valve/Aorta: normal trileaflet aortic valve. Moderate AI  Tricuspid Valve: normal with mild to moderate TR.  Pulmonic Valve: normal  Left Atrium: Enlarged  Right Atrium: Enlarged  Left Ventricle: normal LV size and systolic function, estimated LVEF of   65%.  Right Ventricle: normal size and systolic function.  Pericardium/Pleura: no significant pleural effusion, no significant   pericardial effusion.  Pulmonary/RV Pressure: estimated PA systolic pressure of 44 mmHg assuming   an RA pressure of 10 mmHg.  LV Diastolic Function:     Normal left ventricular size and systolic function, estimated LVEF of   65%. Normal right ventricular size and systolic function. Biatrial   enlargement. The aortic root is normal in size. The mitral valve is   structurally normal, mild to moderate MR. The aortic valve is trileaflet   without stenosis. Moderate AI. Mild to moderate TR. Estimated PA systolic   pressure is 44 mm Hg, assuming an RA pressure of 10 mmHg. No significant   pericardial effusion.                  MELBA DE LA CRUZ M.D., ATTENDING CARDIOLOGIST  This document has been electronically signed. 2018 10:38AM    < end of copied text >    Stress Testing:     Cath:    Imaging:  < from: US Kidney and Bladder (18 @ 10:45) >  EXAM:  US KIDNEYS AND BLADDER                            PROCEDURE DATE:  2018          INTERPRETATION:  CLINICAL INFORMATION: Acute kidney injury upon chronic   kidney disease.    COMPARISON: CT scan of the chest 1/10/2018.    TECHNIQUE: Sonography of the kidneys and bladder.     FINDINGS:    Exam is limited by patient's body habitus and difficulty positioning   patient.    Right kidney:  9 cm.   No hydronephrosis is noted.  Renal cortical thinning is noted. There is a cortical-based cyst   measuring 1.5 cm at the right upper pole. There is a 1.1 cm cyst medially   at the right upper pole.    Left kidney:  11 cm.   There is a 1.5 cm cyst at the midpole.  There is increased echogenicity of the left renal parenchyma, which may   reflect medical renal disease. There appears to be pelvicalyceal   fullness, cannot exclude hydronephrosis.  No shadowing intrarenal calculus is noted.    Urinary bladder: Within normal limits.  The prevoid bladder volume measures approximately 371 cc.  Thepatient is not able to void for this exam.    IMPRESSION:     Limited evaluation, particularly of the left kidney.    Possible mild left-sided hydronephrosis.    Bilateral renal cysts.    Echogenic left kidney may be associated with renal parenchymaldisease.                JANIA TAFOYA M.D., ATTENDING RADIOLOGIST  This document has been electronically signed. 2018  1:03PM    < end of copied text >  < from: CT Chest No Cont (01.10.18 @ 09:25) >  EXAM:  CT CHEST                            PROCEDURE DATE:  01/10/2018          INTERPRETATION:  CLINICAL INFORMATION:  Shortness of breath, evaluate for   chronic lung disease.    PROCEDURE:  Using multislice helical CT, 2.5 mm sections were obtained   from the thoracic inlet through the lung bases.  Multiplanar reformatted images.    COMPARISON: Chest 3/7/2015.    FINDINGS:      There is minimal scarring/fibrosis inferior left lingula and posterior   left lower lobes.  There is atelectasis and/or scarring at the posterior right lower lobe.    There is a 5 mm nodule peripherally right lung apex, stable in   appearance. There are scattered tiny subpleural nodular opacities   bilaterally, stable in appearance.    The central airways remain patent.    No pleural effusion is noted.    There are shotty prevascular, pretracheal mediastinal lymph nodes,   measuring up to 10 mm; stable in appearance.  No enlarged hilar lymphadenopathy is noted.    There is arteriosclerotic calcification of thethoracic aorta.  Coronary artery calcifications are present.     There is an indeterminate subcentimeter hypodense lesion anteriorly right   hepatic lobe.   There is a stable appearance of the 2 cm low-density left adrenal nodule.  There is a small exophytic cyst upper pole right kidney.  There is a subcentimeter, indeterminate hypodense lesion medially at the   upper pole of the right kidney.    Impression:    Stable pulmonary nodule right upper lobe.    Stable mediastinal lymphadenopathy.    Noacute pulmonary process demonstrated.    Other findings as discussed.                          JANIA TAFOYA M.D., ATTENDING RADIOLOGIST  This document has been electronically signed. Pieter 10 2018 10:36AM    < end of copied text >    Interpretation of Telemetry:    Not on Tele  Physical Exam:  Appearance: [ ] Normal  [ ] abnormal [ ] NAD [x] tachypneic tripoding dyspneic [x] obese  Eyes: [x ] PERRL [ ] EOMI  HENT: [x ] Normal [ ] Abnormal oral [place] [x ]NC/AT  Cardiovascular: [x ] S1 [x ] S2 [x ] RRR [ ] m/r/g [ ]edema [ ] JVP  Procedural Access Site: [ ]  hematoma [ ] tender to palpation [ ] 2+ pulse [ ] bruit [ ] Ecchymosis  Respiratory: [ ] Clear to auscultation bilaterally [x] decreased longterm down b/l with scattered tight wheezing  Gastrointestinal: [x ] Soft [ ] tenderness[ ] distension [x ] BS [x] obese  Musculoskeletal: [ ] clubbing [ ] joint deformity   Neurologic: [x ] Non-focal  Lymphatic: [ ] lymphadenopathy [x] 2-3 + pitting edema  Psychiatry: [ ] AAOx3  [x ] confused [ ] disoriented [ ] Mood & affect appropriate   Skin: [ ]  rashes [ ] ecchymoses [x ] cyanosis with oxygen off improved after replaced

## 2018-01-13 NOTE — PROGRESS NOTE ADULT - ASSESSMENT
89 yo F with PMH of afib on eliquis, HTN CHF COPD on home O2, CKD who was recently d/c'd from  rehab, p/w cough, congestion. Admitted with hMPV pna, COPD exacerbation, FITZ, heart failure.    - Pt just back from CT of head which she refused.  On exam pt in tripod position increased work of breathing tachypneic with AMS although prior blood gas was acceptable will get ICU consult.    - Pt with hMPV and neg blood cx with leukocyosis of 13.6 most likely due to steroids.  Pt Cough and congestion likely 2/2 to viral infection.     - Will cont Bumex 1mg IV BID for now watch creatinine closely not clear if she is volume overloaded vs viral infection/COPD but c/w b/l LE edema with abnormal lung exam.    - Monitor strict I&Os and daily weights.     - Would consider changing warfarin from eliquis for a fib due to low cr clearance.  - Pt with FITZ on CKD Cr 3.0 renal following will monitor closely  - No clear evidence of acute ischemia  - No acute changes on EKG compared to previous  - Previous ECHO: April 2017, normal rt ventricle size and function, mod pulm htn, hyperdynamic left ventricle  -1/10/18 ECHO: EF 65%, Moderate MR Moderate AI, Biatrial Enlargement, Moderate TR.  - BP well controlled, continue home BP meds, monitor routine hemodynamics  - Monitor and replete lytes, keep K>4, Mg>2  - Other cardiovascular workup will depend on clinical course.  - All other workup per primary team  - Will follow     Gabrielle Lewis NP-[facility]  Cardiology

## 2018-01-14 DIAGNOSIS — J96.01 ACUTE RESPIRATORY FAILURE WITH HYPOXIA: ICD-10-CM

## 2018-01-14 PROCEDURE — 85027 COMPLETE CBC AUTOMATED: CPT

## 2018-01-14 PROCEDURE — 93306 TTE W/DOPPLER COMPLETE: CPT

## 2018-01-14 PROCEDURE — 87040 BLOOD CULTURE FOR BACTERIA: CPT

## 2018-01-14 PROCEDURE — 96376 TX/PRO/DX INJ SAME DRUG ADON: CPT

## 2018-01-14 PROCEDURE — 80048 BASIC METABOLIC PNL TOTAL CA: CPT

## 2018-01-14 PROCEDURE — 85610 PROTHROMBIN TIME: CPT

## 2018-01-14 PROCEDURE — 83735 ASSAY OF MAGNESIUM: CPT

## 2018-01-14 PROCEDURE — 99285 EMERGENCY DEPT VISIT HI MDM: CPT | Mod: 25

## 2018-01-14 PROCEDURE — 87581 M.PNEUMON DNA AMP PROBE: CPT

## 2018-01-14 PROCEDURE — 94640 AIRWAY INHALATION TREATMENT: CPT

## 2018-01-14 PROCEDURE — 71250 CT THORAX DX C-: CPT

## 2018-01-14 PROCEDURE — 76770 US EXAM ABDO BACK WALL COMP: CPT

## 2018-01-14 PROCEDURE — 87633 RESP VIRUS 12-25 TARGETS: CPT

## 2018-01-14 PROCEDURE — 80053 COMPREHEN METABOLIC PANEL: CPT

## 2018-01-14 PROCEDURE — 83880 ASSAY OF NATRIURETIC PEPTIDE: CPT

## 2018-01-14 PROCEDURE — 96375 TX/PRO/DX INJ NEW DRUG ADDON: CPT

## 2018-01-14 PROCEDURE — 93970 EXTREMITY STUDY: CPT

## 2018-01-14 PROCEDURE — 82962 GLUCOSE BLOOD TEST: CPT

## 2018-01-14 PROCEDURE — 36415 COLL VENOUS BLD VENIPUNCTURE: CPT

## 2018-01-14 PROCEDURE — 87486 CHLMYD PNEUM DNA AMP PROBE: CPT

## 2018-01-14 PROCEDURE — 99231 SBSQ HOSP IP/OBS SF/LOW 25: CPT

## 2018-01-14 PROCEDURE — 85730 THROMBOPLASTIN TIME PARTIAL: CPT

## 2018-01-14 PROCEDURE — 83605 ASSAY OF LACTIC ACID: CPT

## 2018-01-14 PROCEDURE — 99221 1ST HOSP IP/OBS SF/LOW 40: CPT

## 2018-01-14 PROCEDURE — 94660 CPAP INITIATION&MGMT: CPT

## 2018-01-14 PROCEDURE — 96374 THER/PROPH/DIAG INJ IV PUSH: CPT

## 2018-01-14 PROCEDURE — 86140 C-REACTIVE PROTEIN: CPT

## 2018-01-14 PROCEDURE — 93005 ELECTROCARDIOGRAM TRACING: CPT

## 2018-01-14 PROCEDURE — 84145 PROCALCITONIN (PCT): CPT

## 2018-01-14 PROCEDURE — 87798 DETECT AGENT NOS DNA AMP: CPT

## 2018-01-14 PROCEDURE — 82803 BLOOD GASES ANY COMBINATION: CPT

## 2018-01-14 PROCEDURE — 71045 X-RAY EXAM CHEST 1 VIEW: CPT

## 2018-01-14 PROCEDURE — 94760 N-INVAS EAR/PLS OXIMETRY 1: CPT

## 2018-01-14 RX ADMIN — Medication 0.5 MILLIGRAM(S): at 00:12

## 2018-01-14 NOTE — PROGRESS NOTE ADULT - PROVIDER SPECIALTY LIST ADULT
Cardiology
Internal Medicine
Internal Medicine
Nephrology
Neurology
Neurology
Pulmonology
Pulmonology
Critical Care
Internal Medicine

## 2018-01-14 NOTE — DISCHARGE NOTE FOR THE EXPIRED PATIENT - HOSPITAL COURSE
87 yo female, PMHx COPD on home O2, HTN, CKD recently d/c from rehab who presented w/ cough and congestion, admitted with COPD exacerbation secondary to pneumonia due to +hMPV.  Patient was admitted to remote telemetry treated with steroids, bronchodilators, supplemental oxygen, and supportive care. She was restarted on diuretics for diastolic heart failure. Patient subsequently developed increasing encephalopathy presumably secondary to steroid administration, respiratory distress, and hypercapnic hypoxemic respiratory failure and was started on NIPPV. Per family, patient is very nervous at baseline. She was started on Xanax as needed for anxiety and BiPAP compliance, as well as low dose Dilaudid in the setting of renal dysfunction as needed for dyspnea. Patient's respiratory status had markedly improved, as well as encephalopathy. Goals of care were discussed at length with patient's family who understands her severe underlying co-morbidities and stated patient would never want to be sustained on life support, therefore family elected to make patient DNR / DNI. At 01:23, patient was noted on remote telemetry to become acutely hypoxemic with SpO2 50's. Patient subsequently became unresponsive, and bradycardic without palpable pulses. An RRT was called. Patient was unresponsive on BiPAP and no pulses were palpable. A bedside cardiac echo was performed which revealed marked bradycardia. Supplemental oxygen was applied by NRB. Patient's daughter and HCP Tracie was attempted to be contacted twice, a message was left to call back. Patient subequently became asystolic, and was pronounced dead at 01:45. Patient's daughter returned call, and was notified of patient's death, she stated she will be coming to the hospital to see her and would like an autopsy to be performed.

## 2018-01-14 NOTE — DISCHARGE NOTE FOR THE EXPIRED PATIENT - SECONDARY DIAGNOSIS.
Human metapneumovirus (hMPV) pneumonia Metabolic encephalopathy Chronic obstructive pulmonary disease with acute exacerbation Chronic kidney disease, unspecified CKD stage

## 2018-01-14 NOTE — PROGRESS NOTE ADULT - ASSESSMENT
89 yo female, PMHx COPD on home O2, HTN, CKD recently d/c from rehab who presented w/ cough and congestion, admitted with hypoxemic hypercapnic respiratory failure secondary to COPD exacerbation due to +hMPV pneumonia    PLAN:  - Continue NIPPV, titrate vent settings to maintain SaO2 > 88% and pH > 7.25. Encourage BiPAP QHS, and PRN dyspnea / hypoxia / sob. Would keep HOB > 45 degrees for likely Obesity Hypoventilation Syndrome  - Remote tele monitoring  - Continue steroids with taper as respiratory status improves, and bronchodilators  - Continue Xanax PRN anxiety and low-dose Dilaudid PRN dyspnea for patient comfort and NIPPV compliance  - Mental status improving, continue with frequent reorientation, possibly delirium related to acute illness / hospitalization / steroid-induced psychosis  - DNR/DNI signed in chart    The above plan of care was discussed extensively with patient's family who agrees

## 2018-01-14 NOTE — PROGRESS NOTE ADULT - PROBLEM SELECTOR PROBLEM 2
Metabolic encephalopathy
Multi-organ failure with heart failure
Chronic obstructive pulmonary disease with acute exacerbation

## 2018-01-14 NOTE — CHART NOTE - NSCHARTNOTEFT_GEN_A_CORE
Called to bedside by RN for "I think the patient ". Per RN, patient was being cleaned following bowel movement, remained on NIPPV, when she suddenly became unresponsive and pulseless. Remote tele called to advise that patient was hypoxemic. On my arrival, patient was unresponsive, ashen, and pulseless. Tele was called, and patient was reported to have a heart rate of 25 bpm. An RRT was called and oxygen was delivered via ambu-bag in attempts to restore perfusion and cardiac contractility. POCUS was utilized to confirm cardiac activity. I attempted to contact patient's daughter twice, but was unable to reach her at the number provided, a message was left to return call. On arrival back to bedside, patient became asystolic on cardiac monitor and patient was pronounced dead at 01:45. I discussed preceeding events with telemetry technician, who states patient becaume acutely hypoxemic to 50's, then became profoundly bradycardic within minutes later. Daughter Tracie returned phone call and was informed of patient's passing. Patient's daughter came to bedside to see patient, and on arrival, changed mind and elected not to have an autopsy performed.

## 2018-01-14 NOTE — PROGRESS NOTE ADULT - PROBLEM SELECTOR PROBLEM 3
Metabolic encephalopathy
Human metapneumovirus (hMPV) pneumonia
FITZ (acute kidney injury)
FITZ (acute kidney injury)

## 2018-01-14 NOTE — PROGRESS NOTE ADULT - SUBJECTIVE AND OBJECTIVE BOX
Patient is a 88y old  Female who presents with a chief complaint of SOB, sri. LE edema (10 Pieter 2018 16:27)      BRIEF HOSPITAL COURSE: 89 yo female, PMHx COPD on home O2, HTN, CKD recently d/c from rehab who presented w/ cough and congestion, admitted with COPD exacerbation secondary to pneumonia due to +hMPV.     Events last 24 hours: ICU consult called for increasing encephalopathy, respiratory distress, hypercapnic hypoxemic respiratory failure on NIPPV. Patient re-assessed, NIPPV settings titrated up with improvement in SpO2 and accessory muscle use. Patient was also developing worsening encephalopathy and removing BiPAP due to underlying issues with anxiety. Per family, patient is very nervous at baseline. Started on Xanax for anxiety and BiPAP compliance, as well as low dose Dilaudid in the setting of renal dysfunction for dyspnea. Patient's respiratory status now markedly improved, family states "she is almost at baseline". Encephalopathy improving, patient talking with family at baseline. Discussed goals of care at length with patient's daughter and HCP Tracie with ICU Attending Dr. Dahl; she states patient had formerly always stated she wanted to be placed on life support in fear of dying, however would not want to be sustained on long-term life support, and during this hospitalization has become unsure if she would want to be intubated (earlier in the evening the patient had told the ICU team she would not want to be intubated). Patient's daughter expresses understanding of the severity of patient's co-morbidities and had planned for a trial of intubation, then elective extubation should she not be able to come off the ventilator. However, after speaking with the patient and her sisters, the patient and family are in agreement that she would not want to be intubated, and they have elected for DNR/DNI. Family has expressed they would like to see her comfortable.    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  GERD (Gastroesophageal Reflux Disease)  AAA (Abdominal Aortic Aneurysm)  Arthritis of Knee  Hx of Bladder Cancer: Late 1990&#x27;s  Hyperlipidemia  Atrial Fibrillation  HTN - Hypertension  Emphysema  S/P breast lumpectomy  S/P hysterectomy  S/P cataract surgery: 2012 - bilateral eyes  S/P AAA repair: 11/2011  History of Dilatation and Curettage x3: 38 years ago  Transurethral resection of bladder tumor x2: Late 1990&#x27;s - x 2 TURBT  History of Total Hysterectomy with Removal of Both Tubes and Ovaries: 40 years ago  History of Tonsillectomy: 60 years ago      Review of Systems:  CONSTITUTIONAL: No fever, chills, or fatigue  EYES: No eye pain, visual disturbances, or discharge  ENMT:  No difficulty hearing, tinnitus, vertigo; No sinus or throat pain  NECK: No pain or stiffness  RESPIRATORY: +SHORTNESS OF BREATH  CARDIOVASCULAR: No chest pain, palpitations, dizziness, or leg swelling  GASTROINTESTINAL: No abdominal or epigastric pain. No nausea, vomiting, or hematemesis; No diarrhea or constipation. No melena or hematochezia.  GENITOURINARY: No dysuria, frequency, hematuria, or incontinence  NEUROLOGICAL: No headaches, memory loss, loss of strength, numbness, or tremors  SKIN: No itching, burning, rashes, or lesions   MUSCULOSKELETAL: No joint pain or swelling; No muscle, back, or extremity pain  PSYCHIATRIC: No depression, anxiety, mood swings, or difficulty sleeping      Medications:  buMETAnide 1 milliGRAM(s) Oral every 12 hours  metoprolol succinate ER 25 milliGRAM(s) Oral daily  ALBUTerol/ipratropium for Nebulization 3 milliLiter(s) Nebulizer every 6 hours  benzonatate 100 milliGRAM(s) Oral three times a day  buDESOnide   0.5 milliGRAM(s) Respule 0.5 milliGRAM(s) Inhalation two times a day  guaiFENesin    Syrup 100 milliGRAM(s) Oral every 6 hours PRN  guaiFENesin  milliGRAM(s) Oral every 12 hours  tiotropium 18 MICROgram(s) Capsule 1 Capsule(s) Inhalation daily  acetaminophen   Tablet 650 milliGRAM(s) Oral every 6 hours PRN  acetaminophen   Tablet. 650 milliGRAM(s) Oral every 6 hours PRN  ALPRAZolam 0.5 milliGRAM(s) Oral every 6 hours PRN  HYDROmorphone  Injectable 0.5 milliGRAM(s) IV Push every 6 hours PRN  OLANZapine Disintegrating Tablet 5 milliGRAM(s) Oral every 6 hours PRN  OLANZapine Injectable 5 milliGRAM(s) IntraMuscular every 6 hours PRN  QUEtiapine 12.5 milliGRAM(s) Oral at bedtime  pantoprazole    Tablet 40 milliGRAM(s) Oral before breakfast  predniSONE   Tablet 20 milliGRAM(s) Oral two times a day  simvastatin 40 milliGRAM(s) Oral at bedtime  fluticasone propionate 50 MICROgram(s)/spray Nasal Spray 2 Spray(s) Both Nostrils daily  nystatin Powder 1 Application(s) Topical two times a day      ICU Vital Signs Last 24 Hrs  T(C): 36.3 (13 Jan 2018 21:26), Max: 36.7 (13 Jan 2018 04:00)  T(F): 97.4 (13 Jan 2018 21:26), Max: 98.1 (13 Jan 2018 04:00)  HR: 74 (13 Jan 2018 21:26) (74 - 89)  BP: 133/60 (13 Jan 2018 21:26) (133/60 - 149/56)  BP(mean): --  ABP: --  ABP(mean): --  RR: 18 (13 Jan 2018 21:26) (17 - 18)  SpO2: 99% (13 Jan 2018 21:26) (95% - 100%)      ABG - ( 13 Jan 2018 15:16 )  pH: 7.34  /  pCO2: 56    /  pO2: 79    / HCO3: 27    / Base Excess: 4.0   /  SaO2: 95                  I&O's Detail    12 Jan 2018 07:01  -  13 Jan 2018 07:00  --------------------------------------------------------  IN:    Oral Fluid: 240 mL  Total IN: 240 mL    OUT:    Voided: 200 mL  Total OUT: 200 mL    Total NET: 40 mL            LABS:                        12.5   13.6  )-----------( 193      ( 13 Jan 2018 08:48 )             37.3     01-13    138  |  95<L>  |  119<H>  ----------------------------<  140<H>  4.1   |  33<H>  |  3.10<H>    Ca    9.0      13 Jan 2018 08:48            CAPILLARY BLOOD GLUCOSE            CULTURES:  Culture Results:   No growth to date. (01-10 @ 08:36)  Culture Results:   No growth to date. (01-10 @ 08:36)  Rapid RVP Result: Detected (01-09 @ 21:39)      Physical Examination:    General: Obese female sitting up in bed in no acute distress.      HEENT: Pupils equal, reactive to light.  Symmetric.    PULM: On BiPAP. No accessory muscle use. Poor air entry. End-expiratory wheeze throughout    CVS: Regular rate and rhythm, no murmurs, rubs, or gallops    ABD: Soft, nondistended, nontender, normoactive bowel sounds, no masses    EXT: No edema, nontender    SKIN: Warm and well perfused, no rashes noted.    NEURO: Alert, oriented, interactive, nonfocal    RADIOLOGY: < from: Xray Chest 1 View AP/PA (01.13.18 @ 17:43) >    EXAM:  XR CHEST AP OR PA 1V                          PROCEDURE DATE:  01/13/2018      INTERPRETATION:  AP chest on January 13, 2018 at 5:31 PM. Patient is   short of breath.    Film rotated to the left obscuring left base. Patient's chin obscures the   apices.    Heart is likely enlarged.    Small left base pleural pulmonary process and mild right base atelectasis   again noted.    Chest is similar to January 9 of this year.    IMPRESSION: Unchanged chest as above.    JETT ANDERS M.D., ATTENDING RADIOLOGIST  This document has been electronically signed. Jan 13 2018  5:56PM        I have spent 42 minutes evaluating and treating this patient, including reviewing charts, labs, imagining studies, and collaborating with interdisciplinary team.

## 2018-01-14 NOTE — CHART NOTE - NSCHARTNOTEFT_GEN_A_CORE
Resident Rapid Response Note    Patient is a 88y old  Female who presents with a chief complaint of SOB, sri. LE edema (10 Pieter 2018 16:27)      Rapid response was called at on this 88y Female patient for unresponsiveness.     Patient was seen and examined at the bedside by the rapid response team and primary team. ICU PA at bedside.    Rapid Response Vital Signs:  BP: not palpable  HR: 20  RR: unable to assess  SpO2: 50% on   Temp: 97.3  FS: 115    Vital Signs Last 24 Hrs  T(C): 36.3 (13 Jan 2018 21:26), Max: 36.7 (13 Jan 2018 04:00)  T(F): 97.4 (13 Jan 2018 21:26), Max: 98.1 (13 Jan 2018 04:00)  HR: 74 (13 Jan 2018 21:26) (74 - 89)  BP: 133/60 (13 Jan 2018 21:26) (133/60 - 149/56)  BP(mean): --  RR: 18 (13 Jan 2018 21:26) (17 - 18)  SpO2: 99% (13 Jan 2018 21:26) (95% - 100%)    Physical Exam:  General: unresponsive   HEENT: non reactive to light, no EOMI  Neck: Supple, no mass  Neurology: unable to assess   Respiratory: no breath sounds auscultated   CV: no heart sounds auscultated   Abdominal: Soft, ND   Extremities: no peripheral pulses  LABS:                        12.5   13.6  )-----------( 193      ( 13 Jan 2018 08:48 )             37.3     13 Jan 2018 08:48    138    |  95     |  119    ----------------------------<  140    4.1     |  33     |  3.10     Ca    9.0        13 Jan 2018 08:48          CAPILLARY BLOOD GLUCOSE      POCT Blood Glucose.: 115 mg/dL (14 Jan 2018 01:42)    ABG - ( 13 Jan 2018 15:16 )  pH: 7.34  /  pCO2: 56    /  pO2: 79    / HCO3: 27    / Base Excess: 4.0   /  SaO2: 95                  RADIOLOGY & ADDITIONAL TESTS:    Imaging Personally Reviewed:  [ x] YES  [ ] NO    Consultant(s) Notes Reviewed:  [x ] YES  [ ] NO    Care Discussed with Consultants/Other Providers [ x] YES  [ ] NO    Critical care time spent at bedside and coordinating care for patient:    Assessment/Plan:    - patient DNR/DNI  - ICU PA contacted family. family aware and will be coming in to see patient, family would like autopsy   - medicine team in contact with attending    Jameel Resident Rapid Response Note/Expiration Note     Patient is a 88y old  Female who presents with a chief complaint of SOB, sri. LE edema (10 Pieter 2018 16:27)      Rapid response was called at on this 88y Female patient for unresponsiveness.     Patient was seen and examined at the bedside by the rapid response team and primary team. ICU PA at bedside.    Rapid Response Vital Signs:  BP: not palpable  HR: 20  RR: unable to assess  SpO2: 50% on   Temp: 97.3  FS: 115    Vital Signs Last 24 Hrs  T(C): 36.3 (2018 21:26), Max: 36.7 (2018 04:00)  T(F): 97.4 (2018 21:26), Max: 98.1 (2018 04:00)  HR: 74 (2018 21:26) (74 - 89)  BP: 133/60 (2018 21:26) (133/60 - 149/56)  BP(mean): --  RR: 18 (2018 21:26) (17 - 18)  SpO2: 99% (2018 21:26) (95% - 100%)    Physical Exam:  General: unresponsive   HEENT: non reactive to light, no EOMI  Neck: Supple, no mass  Neurology: unable to assess   Respiratory: no breath sounds auscultated   CV: no heart sounds auscultated   Abdominal: Soft, ND   Extremities: no peripheral pulses  LABS:                        12.5   13.6  )-----------( 193      ( 2018 08:48 )             37.3     2018 08:48    138    |  95     |  119    ----------------------------<  140    4.1     |  33     |  3.10     Ca    9.0        2018 08:48          CAPILLARY BLOOD GLUCOSE      POCT Blood Glucose.: 115 mg/dL (2018 01:42)    ABG - ( 2018 15:16 )  pH: 7.34  /  pCO2: 56    /  pO2: 79    / HCO3: 27    / Base Excess: 4.0   /  SaO2: 95                  RADIOLOGY & ADDITIONAL TESTS:    Imaging Personally Reviewed:  [ x] YES  [ ] NO    Consultant(s) Notes Reviewed:  [x ] YES  [ ] NO    Care Discussed with Consultants/Other Providers [ x] YES  [ ] NO    Critical care time spent at bedside and coordinating care for patient:    Assessment/Plan:    - patient DNR/DNI  - patient pronounced  at 01:45 18  - ICU PA contacted family. family aware and will be coming in to see patient, family would like autopsy   - medicine team in contact with attending    Jameel Resident Rapid Response Note/Expiration Note     Patient is a 88y old  Female who presents with a chief complaint of SOB, sri. LE edema (10 Pieter 2018 16:27)      Rapid response was called at on this 88y Female patient for unresponsiveness.     Patient was seen and examined at the bedside by the rapid response team and primary team. ICU PA at bedside.    Rapid Response Vital Signs:  BP: not palpable  HR: 20  RR: unable to assess  SpO2: 50% on   Temp: 97.3  FS: 115    Vital Signs Last 24 Hrs  T(C): 36.3 (2018 21:26), Max: 36.7 (2018 04:00)  T(F): 97.4 (2018 21:26), Max: 98.1 (2018 04:00)  HR: 74 (2018 21:26) (74 - 89)  BP: 133/60 (2018 21:26) (133/60 - 149/56)  BP(mean): --  RR: 18 (2018 21:26) (17 - 18)  SpO2: 99% (2018 21:26) (95% - 100%)    Physical Exam:  General: unresponsive   HEENT: non reactive to light, no EOMI  Neck: Supple, no mass  Neurology: unable to assess   Respiratory: no breath sounds auscultated   CV: no heart sounds auscultated   Abdominal: Soft, ND   Extremities: no peripheral pulses  LABS:                        12.5   13.6  )-----------( 193      ( 2018 08:48 )             37.3     2018 08:48    138    |  95     |  119    ----------------------------<  140    4.1     |  33     |  3.10     Ca    9.0        2018 08:48          CAPILLARY BLOOD GLUCOSE      POCT Blood Glucose.: 115 mg/dL (2018 01:42)    ABG - ( 2018 15:16 )  pH: 7.34  /  pCO2: 56    /  pO2: 79    / HCO3: 27    / Base Excess: 4.0   /  SaO2: 95                  RADIOLOGY & ADDITIONAL TESTS:    Imaging Personally Reviewed:  [ x] YES  [ ] NO    Consultant(s) Notes Reviewed:  [x ] YES  [ ] NO    Care Discussed with Consultants/Other Providers [ x] YES  [ ] NO    Critical care time spent at bedside and coordinating care for patient:    Assessment/Plan:    - patient DNR/DNI  - patient pronounced  at 01:45 18  - ICU PA contacted family. family aware and will be coming in to see patient, family would like autopsy   - medicine team in contact with attending, attending aware     Jameel Resident Rapid Response Note/Expiration Note     Patient is a 88y old  Female who presents with a chief complaint of SOB, sri. LE edema (10 Pieter 2018 16:27)      Rapid response was called at on this 88y Female patient for unresponsiveness.     Patient was seen and examined at the bedside by the rapid response team. ICU PA at bedside.    Per ICU PA, patient became acutely hypoxemic to 50's, then became profoundly bradycardic within minutes later. RR was called and oxygen was delivered via ambu-bag in attempts to restore perfusion and cardiac contractility. Pt. then became asystole on the monitor. Patient was DNR/DNI.  Patient had no response to any stimuli including verbal or tactile stimuli.  No spontaneous movements were present.  No papillary or corneal reflexes were seen.  No breath sounds were auscultated and no spontaneous breaths were seen.  No hearth sounds were heard over the entire precordium and no pulses were palpated.  Patient was pronounced at 1:45 AM.      Rapid Response Vital Signs:  BP: not palpable  HR: 20  RR: unable to assess  SpO2: 50% on   Temp: 97.3  FS: 115    Vital Signs Last 24 Hrs  T(C): 36.3 (2018 21:26), Max: 36.7 (2018 04:00)  T(F): 97.4 (2018 21:26), Max: 98.1 (2018 04:00)  HR: 74 (2018 21:26) (74 - 89)  BP: 133/60 (2018 21:26) (133/60 - 149/56)  RR: 18 (2018 21:26) (17 - 18)  SpO2: 99% (2018 21:26) (95% - 100%)    Physical Exam:  General: unresponsive   HEENT: non reactive to light, no EOMI  Neck: Supple, no mass  Neurology: unable to assess   Respiratory: no breath sounds auscultated   CV: no heart sounds auscultated   Abdominal: Soft, ND   Extremities: no peripheral pulses    LABS:                        12.5   13.6  )-----------( 193      ( 2018 08:48 )             37.3     2018 08:48    138    |  95     |  119    ----------------------------<  140    4.1     |  33     |  3.10     Ca    9.0        2018 08:48          CAPILLARY BLOOD GLUCOSE      POCT Blood Glucose.: 115 mg/dL (2018 01:42)    ABG - ( 2018 15:16 )  pH: 7.34  /  pCO2: 56    /  pO2: 79    / HCO3: 27    / Base Excess: 4.0   /  SaO2: 95        Assessment/Plan:    - patient DNR/DNI  - patient pronounced  at 01:45 18  - ICU PA contacted family. family aware and will be coming in to see patient, family would not like autopsy   - medicine team in contact with attending, attending (ZEKE Wells) aware         Jameel

## 2018-01-14 NOTE — PROGRESS NOTE ADULT - PROBLEM SELECTOR PROBLEM 1
Acute respiratory failure with hypoxia and hypercapnia
Human metapneumovirus (hMPV) pneumonia
Multi-organ failure with heart failure
Human metapneumovirus (hMPV) pneumonia

## 2018-01-15 LAB
CULTURE RESULTS: SIGNIFICANT CHANGE UP
CULTURE RESULTS: SIGNIFICANT CHANGE UP
SPECIMEN SOURCE: SIGNIFICANT CHANGE UP
SPECIMEN SOURCE: SIGNIFICANT CHANGE UP

## 2019-03-08 NOTE — ED ADULT NURSE NOTE - TIMING
Date of service: 03/07/2019



CTA chest PE protocol 



Indication: chest pain/ elevated D-dimer



Technique: 



Contiguous axial images were obtained through the chest with 

intravenous contrast enhancement. Sagittal and coronal 

reconstructions were generated and reviewed. 



This CT exam was performed using 1 or more of the following dose 

reduction techniques: Automated exposure control, adjustment of the 

MAA and/or kV according to patient size, and/or use of iterative 

reconstruction technique. 



IV contrast: 100 mL Visipaque 320 IV







Radiation dose (DLP): 604.38 MGy-cm. 



Comparison: Chest x-ray performed 3/7/19



Findings: 



Visualized portions of the inferior thyroid gland appear 

unremarkable. The mediastinal and hilar vascular structures appear 

within normal limits.  The heart appears within normal limits of 

size. Dense coronary artery calcifications. Atherosclerotic 

calcifications of the aorta. 



No large central or segmental pulmonary embolus evident.



Bibasilar atelectasis. No focal consolidation. No pleural effusion. 

No pneumothorax. 



Limited visualized portions of the upper abdomen: Evidence of prior 

gastric surgery. Partially imaged hepatomegaly. Hepatic steatosis. 

Gallbladder is not visualized presumably due to cholecystectomy. 

Please note cholecystectomy clips are not seen. Common bile duct 

appears dilated. Pancreatic atrophy. 



Osseous demineralization. Degenerative changes. 



Impression: 



No large central or segmental pulmonary embolus identified.



Bibasilar atelectasis. 



Limited visualized portions of the upper abdomen: Evidence of prior 

gastric surgery. Partially imaged hepatomegaly. Hepatic steatosis. 

Gallbladder is not visualized presumably due to cholecystectomy. 

Please note cholecystectomy clips are not seen. Common bile duct 

appears dilated. Pancreatic atrophy. 



Preliminary impression was provided by [facility] unknown

## 2020-06-21 NOTE — PATIENT PROFILE ADULT. - TEACHING/LEARNING FACTORS IMPACT ABILITY TO LEARN
Control of acute pain  Description: Control of acute pain  6/21/2020 1938 by Melanie Cho RN  Outcome: Ongoing  6/21/2020 1752 by Angela Bai RN  Outcome: Ongoing  Goal: Control of chronic pain  Description: Control of chronic pain  6/21/2020 1938 by Melanie Cho RN  Outcome: Ongoing  6/21/2020 1752 by Angela Bai RN  Outcome: Ongoing     Problem: Falls - Risk of:  Goal: Will remain free from falls  Description: Will remain free from falls  6/21/2020 1938 by Melanie Cho RN  Outcome: Ongoing  6/21/2020 1752 by Angela Bai RN  Outcome: Ongoing  Goal: Absence of physical injury  Description: Absence of physical injury  6/21/2020 1938 by Melanie Cho RN  Outcome: Ongoing  6/21/2020 1752 by Angela Bai RN  Outcome: Ongoing none

## 2021-01-08 NOTE — ED ADULT NURSE NOTE - NS ED NURSE LEVEL OF CONSCIOUSNESS SPEECH
[facility] ([facility]): 911 or go to the nearest Emergency Room Speaking Coherently

## 2022-03-30 NOTE — PHYSICAL THERAPY INITIAL EVALUATION ADULT - PREDICTED DURATION OF THERAPY (DAYS/WKS), PT EVAL
----- Message from Martinez Delarosa sent at 3/30/2022 10:46 AM CDT -----  Regarding: Call Back  Name of Who is Calling:MANJIT LEMON [[id number]              What is the request in detail: Patient requesting a call back concern appointment today. Please assist              Can the clinic reply by [facility]: No              [facility][phone]     2 weeks

## 2024-03-18 NOTE — ED ADULT NURSE NOTE - [facility] ED SYMPTOMS NEG
----- Message from Yazan Chan MD sent at 3/18/2024 11:23 AM CDT -----  Patient needs CMP, lipid, glyco-.  Thanks  ----- Message -----  From: Christiano Rice, [facility]  Sent: 3/18/2024  10:14 AM CDT  To: Yazan Chan MD    Patient is on the lab schedule for this coming Thursday March 21, 2024  Please place orders in chart.  Thank you       
Orders have been entered. No further action needed.   
no decreased eating/drinking/no dizziness/no fever/no pain/no chills/no nausea/no numbness/no vomiting/no weakness/no tingling

## 2024-04-15 NOTE — PATIENT PROFILE ADULT. - FALL HARM RISK TYPE OF ASSESSMENT
Name: Rosa Alvarez      Relationship: Self      Best Callback Number: [phone]       HUB PROVIDED THE RELAY MESSAGE FROM THE OFFICE      PATIENT: VOICED UNDERSTANDING AND HAS NO FURTHER QUESTIONS AT THIS TIME     Admission

## 2024-06-10 NOTE — H&P ADULT - CARDIOVASCULAR
Price (Do Not Change): 0.00 Instructions: This plan will send the code FBSE to the PM system.  DO NOT or CHANGE the price. Detail Level: Simple negative Regular rate & rhythm, normal S1, S2; no murmurs, gallops or rubs; no S3, S4

## 2024-11-12 NOTE — ED ADULT NURSE NOTE - NS ED NURSE LEVEL OF CONSCIOUSNESS ORIENTATION
Take your next dose of Augmentin at noon on Tuesday.  Then take your second dose of Augmentin at home at bedtime on Tuesday.  Afterwards take the Augmentin when you wake up on Wednesday and then every 12 hours until gone.   Oriented - self; Oriented - place; Oriented - time
